# Patient Record
Sex: FEMALE | Race: OTHER | Employment: UNEMPLOYED | URBAN - METROPOLITAN AREA
[De-identification: names, ages, dates, MRNs, and addresses within clinical notes are randomized per-mention and may not be internally consistent; named-entity substitution may affect disease eponyms.]

---

## 2022-04-23 ENCOUNTER — HOSPITAL ENCOUNTER (INPATIENT)
Age: 47
LOS: 3 days | Discharge: HOME | DRG: 645 | End: 2022-04-26
Attending: INTERNAL MEDICINE | Admitting: INTERNAL MEDICINE

## 2022-04-23 ENCOUNTER — APPOINTMENT (OUTPATIENT)
Dept: CT IMAGING | Age: 47
DRG: 645 | End: 2022-04-23

## 2022-04-23 DIAGNOSIS — E27.8 ADRENAL MASS, RIGHT (HCC): Primary | ICD-10-CM

## 2022-04-23 DIAGNOSIS — E11.69 TYPE 2 DIABETES MELLITUS WITH OTHER SPECIFIED COMPLICATION, UNSPECIFIED WHETHER LONG TERM INSULIN USE (HCC): ICD-10-CM

## 2022-04-23 LAB
ALBUMIN SERPL-MCNC: 4.4 GM/DL (ref 3.4–5)
ALP BLD-CCNC: 180 IU/L (ref 40–129)
ALT SERPL-CCNC: 17 U/L (ref 10–40)
ANION GAP SERPL CALCULATED.3IONS-SCNC: 10 MMOL/L (ref 4–16)
AST SERPL-CCNC: 16 IU/L (ref 15–37)
BACTERIA: NEGATIVE /HPF
BANDED NEUTROPHILS ABSOLUTE COUNT: 0.09 K/CU MM
BANDED NEUTROPHILS RELATIVE PERCENT: 1 % (ref 5–11)
BILIRUB SERPL-MCNC: 0.3 MG/DL (ref 0–1)
BILIRUBIN URINE: NEGATIVE MG/DL
BLOOD, URINE: NEGATIVE
BUN BLDV-MCNC: 12 MG/DL (ref 6–23)
CALCIUM SERPL-MCNC: 9.7 MG/DL (ref 8.3–10.6)
CHLORIDE BLD-SCNC: 95 MMOL/L (ref 99–110)
CLARITY: CLEAR
CO2: 28 MMOL/L (ref 21–32)
COLOR: ABNORMAL
CREAT SERPL-MCNC: 0.5 MG/DL (ref 0.6–1.1)
DIFFERENTIAL TYPE: ABNORMAL
EOSINOPHILS ABSOLUTE: 0.2 K/CU MM
EOSINOPHILS RELATIVE PERCENT: 2 % (ref 0–3)
GFR AFRICAN AMERICAN: >60 ML/MIN/1.73M2
GFR NON-AFRICAN AMERICAN: >60 ML/MIN/1.73M2
GLUCOSE BLD-MCNC: 402 MG/DL (ref 70–99)
GLUCOSE BLD-MCNC: 443 MG/DL (ref 70–99)
GLUCOSE, URINE: >1000 MG/DL
HCT VFR BLD CALC: 48.2 % (ref 37–47)
HEMOGLOBIN: 15.1 GM/DL (ref 12.5–16)
KETONES, URINE: NEGATIVE MG/DL
LEUKOCYTE ESTERASE, URINE: NEGATIVE
LIPASE: 41 IU/L (ref 13–60)
LYMPHOCYTES ABSOLUTE: 5 K/CU MM
LYMPHOCYTES RELATIVE PERCENT: 53 % (ref 24–44)
MCH RBC QN AUTO: 28 PG (ref 27–31)
MCHC RBC AUTO-ENTMCNC: 31.3 % (ref 32–36)
MCV RBC AUTO: 89.3 FL (ref 78–100)
MONOCYTES ABSOLUTE: 1 K/CU MM
MONOCYTES RELATIVE PERCENT: 11 % (ref 0–4)
NITRITE URINE, QUANTITATIVE: NEGATIVE
PDW BLD-RTO: 11.9 % (ref 11.7–14.9)
PH, URINE: 6.5 (ref 5–8)
PLATELET # BLD: 312 K/CU MM (ref 140–440)
PLT MORPHOLOGY: ADEQUATE
PMV BLD AUTO: 10.6 FL (ref 7.5–11.1)
POTASSIUM SERPL-SCNC: 3.8 MMOL/L (ref 3.5–5.1)
PROTEIN UA: NEGATIVE MG/DL
RBC # BLD: 5.4 M/CU MM (ref 4.2–5.4)
RBC URINE: 2 /HPF (ref 0–6)
SEGMENTED NEUTROPHILS ABSOLUTE COUNT: 3.1 K/CU MM
SEGMENTED NEUTROPHILS RELATIVE PERCENT: 33 % (ref 36–66)
SMUDGE CELLS: PRESENT
SODIUM BLD-SCNC: 133 MMOL/L (ref 135–145)
SPECIFIC GRAVITY UA: 1.01 (ref 1–1.03)
SQUAMOUS EPITHELIAL: <1 /HPF
T4 FREE: 1.76 NG/DL (ref 0.9–1.8)
TOTAL PROTEIN: 8.2 GM/DL (ref 6.4–8.2)
TRICHOMONAS: ABNORMAL /HPF
TSH HIGH SENSITIVITY: 2.2 UIU/ML (ref 0.27–4.2)
UROBILINOGEN, URINE: 0.2 MG/DL (ref 0.2–1)
WBC # BLD: 9.4 K/CU MM (ref 4–10.5)
WBC # BLD: ABNORMAL 10*3/UL
WBC UA: <1 /HPF (ref 0–5)

## 2022-04-23 PROCEDURE — 84439 ASSAY OF FREE THYROXINE: CPT

## 2022-04-23 PROCEDURE — 81001 URINALYSIS AUTO W/SCOPE: CPT

## 2022-04-23 PROCEDURE — 83835 ASSAY OF METANEPHRINES: CPT

## 2022-04-23 PROCEDURE — 99285 EMERGENCY DEPT VISIT HI MDM: CPT

## 2022-04-23 PROCEDURE — 6360000004 HC RX CONTRAST MEDICATION: Performed by: PHYSICIAN ASSISTANT

## 2022-04-23 PROCEDURE — 74177 CT ABD & PELVIS W/CONTRAST: CPT

## 2022-04-23 PROCEDURE — 82962 GLUCOSE BLOOD TEST: CPT

## 2022-04-23 PROCEDURE — 87086 URINE CULTURE/COLONY COUNT: CPT

## 2022-04-23 PROCEDURE — 84244 ASSAY OF RENIN: CPT

## 2022-04-23 PROCEDURE — 36415 COLL VENOUS BLD VENIPUNCTURE: CPT

## 2022-04-23 PROCEDURE — 80053 COMPREHEN METABOLIC PANEL: CPT

## 2022-04-23 PROCEDURE — 84443 ASSAY THYROID STIM HORMONE: CPT

## 2022-04-23 PROCEDURE — 82627 DEHYDROEPIANDROSTERONE: CPT

## 2022-04-23 PROCEDURE — 83036 HEMOGLOBIN GLYCOSYLATED A1C: CPT

## 2022-04-23 PROCEDURE — 85027 COMPLETE CBC AUTOMATED: CPT

## 2022-04-23 PROCEDURE — 1200000000 HC SEMI PRIVATE

## 2022-04-23 PROCEDURE — 6370000000 HC RX 637 (ALT 250 FOR IP): Performed by: NURSE PRACTITIONER

## 2022-04-23 PROCEDURE — 83690 ASSAY OF LIPASE: CPT

## 2022-04-23 PROCEDURE — 82088 ASSAY OF ALDOSTERONE: CPT

## 2022-04-23 PROCEDURE — 85007 BL SMEAR W/DIFF WBC COUNT: CPT

## 2022-04-23 RX ORDER — LISINOPRIL 20 MG/1
20 TABLET ORAL DAILY
Status: DISCONTINUED | OUTPATIENT
Start: 2022-04-24 | End: 2022-04-26 | Stop reason: HOSPADM

## 2022-04-23 RX ORDER — ATORVASTATIN CALCIUM 10 MG/1
20 TABLET, FILM COATED ORAL NIGHTLY
Status: DISCONTINUED | OUTPATIENT
Start: 2022-04-23 | End: 2022-04-26 | Stop reason: HOSPADM

## 2022-04-23 RX ORDER — SODIUM CHLORIDE 0.9 % (FLUSH) 0.9 %
5-40 SYRINGE (ML) INJECTION PRN
Status: DISCONTINUED | OUTPATIENT
Start: 2022-04-23 | End: 2022-04-26 | Stop reason: HOSPADM

## 2022-04-23 RX ORDER — DEXTROSE MONOHYDRATE 25 G/50ML
12.5 INJECTION, SOLUTION INTRAVENOUS PRN
Status: DISCONTINUED | OUTPATIENT
Start: 2022-04-23 | End: 2022-04-23

## 2022-04-23 RX ORDER — ONDANSETRON 4 MG/1
4 TABLET, ORALLY DISINTEGRATING ORAL EVERY 8 HOURS PRN
Status: DISCONTINUED | OUTPATIENT
Start: 2022-04-23 | End: 2022-04-26 | Stop reason: HOSPADM

## 2022-04-23 RX ORDER — LISINOPRIL 20 MG/1
20 TABLET ORAL DAILY
Status: ON HOLD | COMMUNITY
End: 2022-04-26 | Stop reason: SDUPTHER

## 2022-04-23 RX ORDER — ACETAMINOPHEN 325 MG/1
650 TABLET ORAL EVERY 6 HOURS PRN
Status: DISCONTINUED | OUTPATIENT
Start: 2022-04-23 | End: 2022-04-26 | Stop reason: HOSPADM

## 2022-04-23 RX ORDER — SODIUM CHLORIDE 0.9 % (FLUSH) 0.9 %
5-40 SYRINGE (ML) INJECTION EVERY 12 HOURS SCHEDULED
Status: DISCONTINUED | OUTPATIENT
Start: 2022-04-23 | End: 2022-04-26 | Stop reason: HOSPADM

## 2022-04-23 RX ORDER — DEXTROSE MONOHYDRATE 50 MG/ML
100 INJECTION, SOLUTION INTRAVENOUS PRN
Status: DISCONTINUED | OUTPATIENT
Start: 2022-04-23 | End: 2022-04-26 | Stop reason: HOSPADM

## 2022-04-23 RX ORDER — HYDROCODONE BITARTRATE AND ACETAMINOPHEN 5; 325 MG/1; MG/1
1 TABLET ORAL EVERY 6 HOURS PRN
Status: DISPENSED | OUTPATIENT
Start: 2022-04-23 | End: 2022-04-25

## 2022-04-23 RX ORDER — ACETAMINOPHEN 650 MG/1
650 SUPPOSITORY RECTAL EVERY 6 HOURS PRN
Status: DISCONTINUED | OUTPATIENT
Start: 2022-04-23 | End: 2022-04-26 | Stop reason: HOSPADM

## 2022-04-23 RX ORDER — NICOTINE POLACRILEX 4 MG
15 LOZENGE BUCCAL PRN
Status: DISCONTINUED | OUTPATIENT
Start: 2022-04-23 | End: 2022-04-23

## 2022-04-23 RX ORDER — ONDANSETRON 2 MG/ML
4 INJECTION INTRAMUSCULAR; INTRAVENOUS EVERY 6 HOURS PRN
Status: DISCONTINUED | OUTPATIENT
Start: 2022-04-23 | End: 2022-04-26 | Stop reason: HOSPADM

## 2022-04-23 RX ORDER — SODIUM CHLORIDE 9 MG/ML
25 INJECTION, SOLUTION INTRAVENOUS PRN
Status: DISCONTINUED | OUTPATIENT
Start: 2022-04-23 | End: 2022-04-26 | Stop reason: HOSPADM

## 2022-04-23 RX ORDER — INSULIN LISPRO 100 [IU]/ML
0-12 INJECTION, SOLUTION INTRAVENOUS; SUBCUTANEOUS
Status: DISCONTINUED | OUTPATIENT
Start: 2022-04-24 | End: 2022-04-26

## 2022-04-23 RX ORDER — SIMVASTATIN 20 MG
20 TABLET ORAL NIGHTLY
Status: ON HOLD | COMMUNITY
End: 2022-04-26 | Stop reason: SDUPTHER

## 2022-04-23 RX ORDER — POLYETHYLENE GLYCOL 3350 17 G/17G
17 POWDER, FOR SOLUTION ORAL DAILY PRN
Status: DISCONTINUED | OUTPATIENT
Start: 2022-04-23 | End: 2022-04-26 | Stop reason: HOSPADM

## 2022-04-23 RX ORDER — GLIPIZIDE 10 MG/1
10 TABLET ORAL
Status: ON HOLD | COMMUNITY
End: 2022-04-26 | Stop reason: HOSPADM

## 2022-04-23 RX ORDER — ENOXAPARIN SODIUM 100 MG/ML
40 INJECTION SUBCUTANEOUS DAILY
Status: DISCONTINUED | OUTPATIENT
Start: 2022-04-24 | End: 2022-04-26 | Stop reason: HOSPADM

## 2022-04-23 RX ORDER — INSULIN LISPRO 100 [IU]/ML
0-6 INJECTION, SOLUTION INTRAVENOUS; SUBCUTANEOUS NIGHTLY
Status: DISCONTINUED | OUTPATIENT
Start: 2022-04-23 | End: 2022-04-24

## 2022-04-23 RX ADMIN — ATORVASTATIN CALCIUM 20 MG: 10 TABLET, FILM COATED ORAL at 20:09

## 2022-04-23 RX ADMIN — IOPAMIDOL 75 ML: 755 INJECTION, SOLUTION INTRAVENOUS at 14:44

## 2022-04-23 RX ADMIN — INSULIN LISPRO 6 UNITS: 100 INJECTION, SOLUTION INTRAVENOUS; SUBCUTANEOUS at 20:09

## 2022-04-23 ASSESSMENT — ENCOUNTER SYMPTOMS
NAUSEA: 0
EYE PAIN: 0
BACK PAIN: 0
VOMITING: 0
ABDOMINAL PAIN: 1
RHINORRHEA: 0
SHORTNESS OF BREATH: 0
COUGH: 0
DIARRHEA: 0

## 2022-04-23 ASSESSMENT — PAIN - FUNCTIONAL ASSESSMENT: PAIN_FUNCTIONAL_ASSESSMENT: NONE - DENIES PAIN

## 2022-04-23 NOTE — ED TRIAGE NOTES
Pt is out of medications and could not get a refill, increase in urination at times, general feeling of unwell as well. Pt speaks [place]   number [id number]  PT sts that she has not been feeling well for several days, uncle passed away and she thinks that is one reason why she is not feeling well. Pt states that she has been having abdominal pain and that she has been taking medication for \"blood sugar\" but ran out. Pt sts she has been out of medications for about a month. Pt sts the \"bottom of her belly hurts\" and that she \"goes to the bathroom a lot\".

## 2022-04-23 NOTE — ED PROVIDER NOTES
**ADVANCED PRACTICE PROVIDER, I HAVE EVALUATED THIS PATIENT**        [address] ENCOUNTER      Pt Name: Martínez Arnold  [facility]:[id number]  [place] 1975  Date of evaluation: 4/23/2022  Provider: Ayah Montes PA-C      Chief Complaint:    Chief Complaint   Patient presents with    Urinary Frequency    Other     feels sick         Nursing Notes, Past Medical Hx, Past Surgical Hx, Social Hx, Allergies, and Family Hx were all reviewed and agreed with or any disagreements were addressed in the HPI.    HPI: (Location, Duration, Timing, Severity, Quality, Assoc Sx, Context, Modifying factors)    Chief Complaint of abdominal pain    This is a  52 y.o. female who presents with concern for abdominal pain. During her discussion she mentions: She has had some left lower abdominal pain on and off again for well over a year, mentioning that she has had had at first while she was living in [facility]. Prior to recently coming to [place], she was living in [place] for a year. She describes it as intermittent nonradiating nonmigrating. She mentions that her main reason why she came in here today. Although she does mention that she has had increased urination as well. She tells me that she has been off of her lisinopril atorvastatin glipizide and metformin for approximately 1 month, those were last prescribed by  In [place]. Her last menstrual period was 6 weeks ago. She mentions at that time she had some abdominal pain, but it states that there is different than the abdominal pain that brought her in today. Otherwise she denies any fever, nausea, vomiting, flank pain, dysuria, abnormal vaginal discharge, dyspareunia, vaginal bleeding, chest pain, shortness of breath, URI symptoms. PastMedical/Surgical History:  No past medical history on file. No past surgical history on file.     Medications:  Previous Medications    GLIPIZIDE (GLUCOTROL) 10 MG TABLET    Take 10 mg by mouth 2 times daily (before meals)    LISINOPRIL (PRINIVIL;ZESTRIL) 20 MG TABLET    Take 20 mg by mouth daily    METFORMIN (GLUCOPHAGE) 500 MG TABLET    Take 500 mg by mouth 2 times daily (with meals)    SIMVASTATIN (ZOCOR) 20 MG TABLET    Take 20 mg by mouth nightly         Review of Systems:  (2-9 systems needed)  Review of Systems   Constitutional: Negative for chills and fever. HENT: Negative for congestion and rhinorrhea. Eyes: Negative for pain and visual disturbance. Respiratory: Negative for cough and shortness of breath. Cardiovascular: Negative for chest pain and leg swelling. Gastrointestinal: Positive for abdominal pain. Negative for diarrhea, nausea and vomiting. Endocrine: Positive for polyuria. Genitourinary: Negative for dysuria and hematuria. Musculoskeletal: Negative for back pain and myalgias. Skin: Negative for rash and wound. Neurological: Negative for dizziness and light-headedness. \"Positives and Pertinent negatives as per HPI\"    Physical Exam:  Physical Exam  Vitals and nursing note reviewed. Constitutional:       Appearance: Normal appearance. She is well-developed. She is not ill-appearing or diaphoretic. HENT:      Head: Normocephalic and atraumatic. Eyes:      General: No scleral icterus. Right eye: No discharge. Left eye: No discharge. Cardiovascular:      Rate and Rhythm: Normal rate and regular rhythm. Heart sounds: Normal heart sounds. No murmur heard. No friction rub. No gallop. Pulmonary:      Effort: Pulmonary effort is normal. No respiratory distress. Breath sounds: Normal breath sounds. No stridor. No wheezing or rales. Chest:      Chest wall: No tenderness. Abdominal:      General: Bowel sounds are normal. There is no distension. Palpations: Abdomen is soft. There is no mass. Tenderness: There is abdominal tenderness in the left lower quadrant.  There is no right CVA tenderness, left CVA tenderness, guarding or rebound. Musculoskeletal:         General: No tenderness. Normal range of motion. Cervical back: Normal range of motion and neck supple. Skin:     General: Skin is warm and dry. Coloration: Skin is not pale. Neurological:      Mental Status: She is alert and oriented to person, place, and time. Coordination: Coordination normal.   Psychiatric:         Mood and Affect: Mood normal.         Behavior: Behavior normal.         MEDICAL DECISION MAKING    Vitals:    Vitals:    04/23/22 0947   BP: (!) 179/98   Pulse: 79   Resp: 16   Temp: 98.6 °F (37 °C)   TempSrc: Oral   SpO2: 97%       LABS:  Labs Reviewed   URINALYSIS WITH MICROSCOPIC - Abnormal; Notable for the following components:       Result Value    Color, UA STRAW (*)     Glucose, Urine >1,000 (*)     All other components within normal limits   CBC WITH AUTO DIFFERENTIAL - Abnormal; Notable for the following components:    Hematocrit 48.2 (*)     MCHC 31.3 (*)     Bands Relative 1 (*)     Segs Relative 33.0 (*)     Lymphocytes % 53.0 (*)     Monocytes % 11.0 (*)     All other components within normal limits   COMPREHENSIVE METABOLIC PANEL - Abnormal; Notable for the following components:    Sodium 133 (*)     Chloride 95 (*)     CREATININE 0.5 (*)     Glucose 402 (*)     Alkaline Phosphatase 180 (*)     All other components within normal limits   CULTURE, URINE   LIPASE        Remainder of labs reviewed and were negative at this time or not returned at the time of this note.     RADIOLOGY:   Non-plain film images such as CT, Ultrasound and MRI are read by the radiologist. Nicky Peterson PA-C have directly visualized the radiologic plain film image(s) with the below findings:      Interpretation per the Radiologist below, if available at the time of this note:    CT ABDOMEN PELVIS W IV CONTRAST Additional Contrast? None   Final Result   Heterogeneous mixed attenuating mass in the region of the right adrenal   gland. Differentials include large adrenal adenoma, malignancy or possibly   angiomyolipoma. Adrenal hemorrhage or other primary malignancy not excluded. Recommend attention on follow-up and MRI of the abdomen with and without   contrast adrenal mass protocol. Heterogeneous masslike appearance of the uterus, likely with underlying   uterine fibroids. Other pathology not excluded. Recommend correlation with   pelvic ultrasound. No results found. MEDICAL DECISION MAKING / ED COURSE:      PROCEDURES:   Procedures    None    Patient was given:  Medications   iopamidol (ISOVUE-370) 76 % injection 75 mL (75 mLs IntraVENous Given 4/23/22 2464)       52year-old type II diabetic presents with above HPI. Patient speaks [place], but I was able to have a detailed discussion using video . She is alert oriented no acute distress nontoxic. Appears well-hydrated she has some mild left lower quadrant pain no CVA tenderness. Sounds like she has no local primary care. Her vitals today within normal limits. Plan: Lab work, urinalysis, pregnancy test, consideration for ultrasound and CT due to her left lower quadrant pain. We will also plan to refill her chronic medications, and provide resources for outpatient follow-up. @1600 patient's lab work shows some elevated alk phos and hyperglycemia, no urine ketones. Alk phos 180. Otherwise labs appear to be within normal limits. Vitals within normal limits. CT abdomen pelvis does show a 5.6x0.6 mass, the right adrenal gland region, with a nearby punctate calcification. Interpretation by radiologist includes malignancy, adrenal adenoma, angiomyolipoma. With recommendations for MRI. There are also some masslike appearances in the uterus, appear to be consistent with her history of uterine fibroids.   Discussed this with the ED attending is Dr. Elizabeth Basurto and Dr. Fatemeh Cardona, and we will plan for general surgery consult. Concern we have is that patient has limited outpatient local resources. I also discussed this with case management who had FaceTime with patient, who feels the potential for her to follow-up as an outpatient is extremely low. @5906 I discussed with on-call general surgeon Dr. Nicolas Richmond regarding patient's presenting symptoms, CT findings, and social history. He does feel that potentially patient could be further evaluated as an outpatient, but is agreeable to see patient in consult if admission is warranted due to social situations. He does advise patient will need further biochemical work-up, and agrees for MRI prior to any surgical intervention. After discussing interpreted size of mass \"5.6 x 0.6\" with Dr. Nicolas Richmond who had commented on the dimension of 0.6  I reviewed CT images and it did appear larger, and I contacted [facility] and spoke with their radiologist covering for the initial radiologist Dr. Hunter Verdugo, and he confirmed that there was a error in the documentation. this will be addended, but they are interpreting the size of the mass as 5.6 x 4.6 x 5.9. cm.    @0083 patient discussed with and accepted by hospitalist Worth Goodpasture CNP. The patient tolerated their visit well. I evaluated the patient. The physician was available for consultation as needed. The patient and / or the family were informed of the results of any tests, a time was given to answer questions, a plan was proposed and they agreed with plan. CLINICAL IMPRESSION:  1.  Adrenal mass, right [facility])        DISPOSITION        PATIENT REFERRED TO:  [facility] - ADULT  [address]  [phone]          DISCHARGE MEDICATIONS:  New Prescriptions    No medications on file       DISCONTINUED MEDICATIONS:  Discontinued Medications    No medications on file              (Please note the MDM and HPI sections of this note were completed with a voice recognition

## 2022-04-23 NOTE — CARE COORDINATION
CM - Room # 22 --pt in ER today with medication refill  needs , this is the first visit recorded for this pt . CM will also be using an  for [place] [employer] . Wilfred Cm The four medications that are listed on her chart have been ran thru [employer] for price reduction , and are as follows --;    Simvastin -30 tabs , 20mg----0 .16 cents ----at [facility]     Glipizide---60 tabs, 10 mg----$7.28 ----------at [facility]    Lisinopril--30 tabs , 20 mg ---0.34 cents----at [facility]    Metformin-60 tabs , 500 mg--$5.87----------at [facility]    All coupons given to pt Additionally , [facility] -[facility] , address has been included in discharge information, they have resources for diabetes . Wilfred Cm Last, a follow up for appointment has been given to the [facility] for after care and a PCP.    ----16:35---Due to results that were noted on the Cat Scan abdomen / pelvis--it would be difficult to be a pt in her social and economic position to continue with any continuity of care as  Admission to the hospital enters the picture .      Jessica Tena / Lucila Oscar

## 2022-04-23 NOTE — H&P
History and Physical      Name:  Gabo Arnold /Age/Sex: 1975  (52 y.o. female)   MRN & CSN:  [id number] & [id number] Admission Date/Time: 2022  9:43 AM   Location:  ED22/ED-22 PCP: No primary care provider on file. Hospital Day: 1     Attending physician:  Dr. Mariana Metzger and Plan:   Gabo Arnold is a 52 y.o.  female  who presents with abdominal pain found to have an  Adrenal mass greater than 4 cm in diameter [facility])    Assessment and plan:     Adrenal mass: Right. Symptomatic with abdominal pain. As demonstrated on CT A/P-large heterogenous mass and adjacent puncture clarification in region of the right adrenal gland measuring 5.6 x 4.6. ER attending spoke with radiology who corrected size of mass. -Inpatient  -Consult to general surgery  -Consult to hematology/oncology  -MRI abdomen pelvis with and without contrast  -Analgesics and antiemetics    Diabetes Mellitus type II-with hyperglycemia glucose on admission 402. No previous lab results on file unknown A1c. Patiently recently moved to [place] 9 months ago states has not taken her medication in over a month. Managed on glipizide  - SSI  - POCT glucose qACHS  - hypoglycemia management protocol   - target blood glucose 100-180  - ADA carb controlled diet  -Patient will need referral for follow-up with primary care    Uterine fibroids: As demonstrated on CT abdomen pelvis, patient states she has had those before. -Recommended referral to gynecology at discharge    Pseudohyponatremia: 133-sodium glucose correction-138-monitor BMP    Essential Hypertension- PTA regimen lisinopril. Monitor BP trends. Target /80 or less  -continue home anti-hypertensive medication(s). Hyperlipidemia: Continue OP statin therapy. Last Lipid panel not on file.  -Lipid panel in a.m. Chronic Conditions: Continue all home medications except as stated above or contraindicated.     BMI-no data on file kg/m2 Life style modifications-please obtain height and weight and document results. Disposition: Inpatient Patient was accepted for continue evaluation and treatment and improvement of clinical symptoms. Discussed assessment and treatment plan with the admitting and supervising physician who agrees with the plan. Diet ADULT DIET; Regular; 4 carb choices (60 gm/meal)   DVT Prophylaxis [x] Lovenox, []  Heparin, [] SCDs, [] Warfarin  [] NOAC     GI Prophylaxis [] PPI,  [] H2 Blocker,  [] Carafate,  [] Diet/Tube Feeds   Code Status Full Code     History of Present Illness:     Chief Complaint: Left lower quadrant abdominal pain found to have Adrenal mass greater than 4 cm in diameter ([facility] [address]  Jose David Gagnon is a 52 y.o.  female, with past medical history significant for diabetes mellitus, hypertension, hyperlipidemia, who presented to the emergency room with complaint of abdominal pain. Patient reports she has had left lower quadrant abdominal pain intermittently over the past year. Patient states she was living in [facility] and relocated to [place] then she came to [place] 9 months ago. Patient states he has been approximately 1 month or more since she is taking her regular medications last prescription she had was filled by the doctor in [place]. Last menstrual cycle was 6 weeks ago. Patient denies any nausea, vomiting, flank pain. Denies any abnormal vaginal bleeding. Denies any chest pain or shortness of breath or cough. Patient states she came to the hospital today because of the pain in her left lower abdomen. On Examination,the patient patient is resting comfortably in bed. Remote 's was used, [place], #[id number]. Plan of care was explained to patient she had opportunity to ask questions. She was concerned she may have toe surgery I informed her she will see a general surgeon and the cancer specialist for further evaluation of the mass.   At the ED, vital signs;T 98.6,HR 79, RR 16, /98 mm/hg,SPO2 97% RA Significant laboratories were the following: Sodium 133, glucose 402, alk phos 180  The patient was brought to the ED and was subsequently admitted for  further evaluation. and management        Review of Systems   Constitutional: Negative for chills, diaphoresis and fever. HENT: Negative for congestion. Respiratory: Negative for cough and shortness of breath. Cardiovascular: Positive for chest pain. Negative for palpitations. Gastrointestinal: Positive for abdominal pain. Negative for nausea and vomiting. Genitourinary: Negative for dysuria, frequency and urgency. Musculoskeletal: Negative. Skin: Negative. Neurological: Negative. Hematological: Negative. Psychiatric/Behavioral: Negative. Objective:   No intake or output data in the 24 hours ending 04/23/22 1843   Vitals:   Vitals:    04/23/22 1815   BP: (!) 143/91   Pulse: 90   Resp: 16   Temp: 98.8 °F (37.1 °C)   SpO2: 96%     Physical Exam:   GEN- Awake female, NAD, sitting up in bed, appears to be stated age. EYES- Pupils are equally round. HENT- Mucous membranes are moist.  NECK- Supple, no apparent thyromegaly or masses. RESP-Clear to auscultation, no wheezes, rales or rhonchi. Symmetric chest movement while on room air. CARDIO/VASC-  S1/S2 auscultated. Regular rate and rhythm, . Peripheral pulses equal bilaterally and palpable. GI- Abdomen is soft, no tenderness, masses, or guarding. Bowel sounds present. MSK- No gross joint deformities. EXTREMITIES- pulses intact, no swelling, no calf tenderness  SKIN- Normal coloration, warm, dry. NEURO-Cranial nerves appear grossly intact, normal speech, no lateralizing weakness. PSYCH-Awake, alert, oriented x 4.     Past Medical History:      Past Medical History:   Diagnosis Date    Hyperlipidemia     Hypertension     Type 2 diabetes mellitus without complication, without long-term current use of insulin ([address]     Uterine fibroid      PSHX:  has a past surgical history that includes Ectopic pregnancy surgery (N/A). Allergies: None    FAM HX: family history is not on file. Parents are . Patient denies any known significant family medical history  Soc HX:   Social History     Socioeconomic History    Marital status: Single     Spouse name: Not on file    Number of children: Not on file    Years of education: Not on file    Highest education level: Not on file   Occupational History    Not on file   Tobacco Use    Smoking status: Not on file    Smokeless tobacco: Not on file   Substance and Sexual Activity    Alcohol use: Not on file    Drug use: Not on file    Sexual activity: Not on file   Other Topics Concern    Not on file   Social History Narrative    Not on file     Social and family history reviewed with patient/family. Medications:     Home Medication   Prior to Admission medications    Medication Sig Start Date End Date Taking? Authorizing Provider   metFORMIN (GLUCOPHAGE) 500 MG tablet Take 500 mg by mouth 2 times daily (with meals)   Yes Historical Provider, MD   glipiZIDE (GLUCOTROL) 10 MG tablet Take 10 mg by mouth 2 times daily (before meals)   Yes Historical Provider, MD   simvastatin (ZOCOR) 20 MG tablet Take 20 mg by mouth nightly   Yes Historical Provider, MD   lisinopril (PRINIVIL;ZESTRIL) 20 MG tablet Take 20 mg by mouth daily   Yes Historical Provider, MD     Medications:    Infusions:   PRN Meds:     Recent Labs     22  1100   WBC 9.4   HGB 15.1   HCT 48.2*         Recent Labs     22  1100   *   K 3.8   CL 95*   CO2 28   BUN 12   CREATININE 0.5*     Recent Labs     22  1100   AST 16   ALT 17   BILITOT 0.3   ALKPHOS 180*     No results for input(s): INR in the last 72 hours. No results for input(s): CKTOTAL, CKMB, CKMBINDEX, TROPONINT in the last 72 hours.      Imaging reviewed  CT ABDOMEN PELVIS W IV CONTRAST Additional Contrast? None    Result Date: 2022  EXAMINATION: CT OF THE ABDOMEN AND PELVIS WITH CONTRAST 4/23/2022 2:44 pm TECHNIQUE: CT of the abdomen and pelvis was performed with the administration of intravenous contrast. Multiplanar reformatted images are provided for review. Dose modulation, iterative reconstruction, and/or weight based adjustment of the mA/kV was utilized to reduce the radiation dose to as low as reasonably achievable. COMPARISON: None. HISTORY: ORDERING SYSTEM PROVIDED HISTORY: LLQ pain, intermittent TECHNOLOGIST PROVIDED HISTORY: Additional Contrast?->None Reason for exam:->LLQ pain, intermittent Decision Support Exception - unselect if not a suspected or confirmed emergency medical condition->Emergency Medical Condition (MA) Reason for Exam: LLQ pain, intermittent Relevant Medical/Surgical History: 75 ML ISOVUE 370 FINDINGS: Lower Chest: No focal consolidation at the lung bases. The heart is not enlarged. No pericardial effusion. Organs: Liver: Normal appearance of the liver. Gallbladder: Unremarkable gallbladder. No biliary ductal dilatation Spleen: Unremarkable spleen. Pancreas: No peripancreatic inflammatory changes. Adrenal Glands: There is a large heterogeneous mass and adjacent punctate calcification in the region of the right adrenal gland measuring 5.6 x 0.6 cm. This is age indeterminate without a comparison study. Hyperdensity within the mass could reflect areas of enhancement or hemorrhage. No acute findings of the left adrenal gland. Kidneys: No hydronephrosis punctate stone in each kidney. Small fat containing lesion at the superior pole of the left kidney, most compatible with angiomyolipoma. GI/Bowel: Stomach: The stomach is nondistended. Small bowel: No evidence of small bowel obstruction. Colon: No signficant pericolonic inflammatory changes. Appendix: Appendix not visualized, but there are no secondary findings of acute appendicitis.  Pelvis: Heterogeneous enlarged uterus and endocervical canal.  Fibroids or other underlying mass not excluded. No free fluid in the pelvis. Peritoneum/Retroperitoneum: Atherosclerosis of the abdominal aorta. No retroperitoneal lymphadenopathy by CT criteria. Bones/Soft Tissues: No acute findings within the subcutaneous soft tissues. Spondylosis of the visualized spine. Heterogeneous mixed attenuating mass in the region of the right adrenal gland. Differentials include large adrenal adenoma, malignancy or possibly angiomyolipoma. Adrenal hemorrhage or other primary malignancy not excluded. Recommend attention on follow-up and MRI of the abdomen with and without contrast adrenal mass protocol. Heterogeneous masslike appearance of the uterus, likely with underlying uterine fibroids. Other pathology not excluded. Recommend correlation with pelvic ultrasound.           Electronically signed by THAO Ferreira CNP on 4/23/2022 at 6:43 PM

## 2022-04-23 NOTE — PROGRESS NOTES
Attempted to call report, Charge nurse sts she will call back. Attempt for report x2 Charge RN receiving call from staffing will call this RN back.

## 2022-04-23 NOTE — ED NOTES
CT ABDOMEN PELVIS W IV CONTRAST Additional Contrast? None    Status: Final result       Order Providers    Authorizing Billing   JANAE Dennis DO            Signed by    Signed Date/Time Phone Pager   Keri Carpenter 4/23/2022  3:02 [phone]      Reading Providers    Read Date Phone Pager   Keri Carpenter Sat Apr 23, 2022  3:02 [phone]        CT ABDOMEN PELVIS W IV CONTRAST Additional Contrast? None: Patient Communication     Not Released  Not seen     Radiation Dose Estimates    No radiation information found for this patient  Narrative   EXAMINATION:   CT OF THE ABDOMEN AND PELVIS WITH CONTRAST 4/23/2022 2:44 pm       TECHNIQUE:   CT of the abdomen and pelvis was performed with the administration of   intravenous contrast. Multiplanar reformatted images are provided for review. Dose modulation, iterative reconstruction, and/or weight based adjustment of   the mA/kV was utilized to reduce the radiation dose to as low as reasonably   achievable.       COMPARISON:   None.       HISTORY:   ORDERING SYSTEM PROVIDED HISTORY: LLQ pain, intermittent   TECHNOLOGIST PROVIDED HISTORY:   Additional Contrast?->None   Reason for exam:->LLQ pain, intermittent   Decision Support Exception - unselect if not a suspected or confirmed   emergency medical condition->Emergency Medical Condition (MA)   Reason for Exam: LLQ pain, intermittent   Relevant Medical/Surgical History: 75 ML ISOVUE 370       FINDINGS:   Lower Chest: No focal consolidation at the lung bases.  The heart is not   enlarged.  No pericardial effusion.       Organs:       Liver: Normal appearance of the liver.       Gallbladder: Unremarkable gallbladder. No biliary ductal dilatation       Spleen: Unremarkable spleen.       Pancreas: No peripancreatic inflammatory changes.       Adrenal Glands:  There is a large heterogeneous mass and adjacent punctate   calcification in the region of the right adrenal gland measuring 5.6 x 0.6   cm.  This is age indeterminate without a comparison study.  Hyperdensity   within the mass could reflect areas of enhancement or hemorrhage.  No acute   findings of the left adrenal gland.       Kidneys: No hydronephrosis punctate stone in each kidney.  Small fat   containing lesion at the superior pole of the left kidney, most compatible   with angiomyolipoma.       GI/Bowel:       Stomach: The stomach is nondistended.       Small bowel: No evidence of small bowel obstruction.       Colon: No signficant pericolonic inflammatory changes.       Appendix: Appendix not visualized, but there are no secondary findings of   acute appendicitis.       Pelvis: Heterogeneous enlarged uterus and endocervical canal.  Fibroids or   other underlying mass not excluded.  No free fluid in the pelvis.       Peritoneum/Retroperitoneum: Atherosclerosis of the abdominal aorta.  No   retroperitoneal lymphadenopathy by CT criteria.       Bones/Soft Tissues: No acute findings within the subcutaneous soft tissues. Spondylosis of the visualized spine.           Impression   Heterogeneous mixed attenuating mass in the region of the right adrenal   gland.  Differentials include large adrenal adenoma, malignancy or possibly   angiomyolipoma.  Adrenal hemorrhage or other primary malignancy not excluded. Recommend attention on follow-up and MRI of the abdomen with and without   contrast adrenal mass protocol.       Heterogeneous masslike appearance of the uterus, likely with underlying   uterine fibroids.  Other pathology not excluded.  Recommend correlation with   pelvic ultrasound.           Ambreen Adams RN  04/23/22 1921

## 2022-04-24 ENCOUNTER — APPOINTMENT (OUTPATIENT)
Dept: MRI IMAGING | Age: 47
DRG: 645 | End: 2022-04-24

## 2022-04-24 ENCOUNTER — APPOINTMENT (OUTPATIENT)
Dept: ULTRASOUND IMAGING | Age: 47
DRG: 645 | End: 2022-04-24

## 2022-04-24 LAB
ALBUMIN SERPL-MCNC: 3.9 GM/DL (ref 3.4–5)
ALP BLD-CCNC: 155 IU/L (ref 40–128)
ALT SERPL-CCNC: 14 U/L (ref 10–40)
ANION GAP SERPL CALCULATED.3IONS-SCNC: 8 MMOL/L (ref 4–16)
AST SERPL-CCNC: 12 IU/L (ref 15–37)
BASOPHILS ABSOLUTE: 0 K/CU MM
BASOPHILS RELATIVE PERCENT: 0.4 % (ref 0–1)
BILIRUB SERPL-MCNC: 0.2 MG/DL (ref 0–1)
BUN BLDV-MCNC: 12 MG/DL (ref 6–23)
CALCIUM SERPL-MCNC: 9.2 MG/DL (ref 8.3–10.6)
CHLORIDE BLD-SCNC: 101 MMOL/L (ref 99–110)
CHOLESTEROL: 164 MG/DL
CO2: 26 MMOL/L (ref 21–32)
CREAT SERPL-MCNC: 0.5 MG/DL (ref 0.6–1.1)
CULTURE: NORMAL
DIFFERENTIAL TYPE: ABNORMAL
EOSINOPHILS ABSOLUTE: 0.1 K/CU MM
EOSINOPHILS RELATIVE PERCENT: 1.1 % (ref 0–3)
ESTIMATED AVERAGE GLUCOSE: 326 MG/DL
ESTIMATED AVERAGE GLUCOSE: 329 MG/DL
GFR AFRICAN AMERICAN: >60 ML/MIN/1.73M2
GFR NON-AFRICAN AMERICAN: >60 ML/MIN/1.73M2
GLUCOSE BLD-MCNC: 253 MG/DL (ref 70–99)
GLUCOSE BLD-MCNC: 336 MG/DL (ref 70–99)
GLUCOSE BLD-MCNC: 371 MG/DL (ref 70–99)
GLUCOSE BLD-MCNC: 395 MG/DL (ref 70–99)
GLUCOSE BLD-MCNC: 427 MG/DL (ref 70–99)
GLUCOSE BLD-MCNC: 428 MG/DL (ref 70–99)
HBA1C MFR BLD: 13 % (ref 4.2–6.3)
HBA1C MFR BLD: 13.1 % (ref 4.2–6.3)
HCT VFR BLD CALC: 47.1 % (ref 37–47)
HDLC SERPL-MCNC: 43 MG/DL
HEMOGLOBIN: 14.8 GM/DL (ref 12.5–16)
IMMATURE NEUTROPHIL %: 0.2 % (ref 0–0.43)
LDL CHOLESTEROL CALCULATED: 105 MG/DL
LYMPHOCYTES ABSOLUTE: 3.9 K/CU MM
LYMPHOCYTES RELATIVE PERCENT: 48.8 % (ref 24–44)
Lab: NORMAL
MCH RBC QN AUTO: 27.8 PG (ref 27–31)
MCHC RBC AUTO-ENTMCNC: 31.4 % (ref 32–36)
MCV RBC AUTO: 88.5 FL (ref 78–100)
MONOCYTES ABSOLUTE: 0.5 K/CU MM
MONOCYTES RELATIVE PERCENT: 6.2 % (ref 0–4)
NUCLEATED RBC %: 0 %
PDW BLD-RTO: 12 % (ref 11.7–14.9)
PLATELET # BLD: 310 K/CU MM (ref 140–440)
PMV BLD AUTO: 11.3 FL (ref 7.5–11.1)
POTASSIUM SERPL-SCNC: 4.8 MMOL/L (ref 3.5–5.1)
RBC # BLD: 5.32 M/CU MM (ref 4.2–5.4)
SEGMENTED NEUTROPHILS ABSOLUTE COUNT: 3.5 K/CU MM
SEGMENTED NEUTROPHILS RELATIVE PERCENT: 43.3 % (ref 36–66)
SODIUM BLD-SCNC: 135 MMOL/L (ref 135–145)
SPECIMEN: NORMAL
TOTAL IMMATURE NEUTOROPHIL: 0.02 K/CU MM
TOTAL NUCLEATED RBC: 0 K/CU MM
TOTAL PROTEIN: 6.7 GM/DL (ref 6.4–8.2)
TRIGL SERPL-MCNC: 78 MG/DL
WBC # BLD: 8.1 K/CU MM (ref 4–10.5)

## 2022-04-24 PROCEDURE — 6370000000 HC RX 637 (ALT 250 FOR IP): Performed by: NURSE PRACTITIONER

## 2022-04-24 PROCEDURE — 81050 URINALYSIS VOLUME MEASURE: CPT

## 2022-04-24 PROCEDURE — 94761 N-INVAS EAR/PLS OXIMETRY MLT: CPT

## 2022-04-24 PROCEDURE — 36415 COLL VENOUS BLD VENIPUNCTURE: CPT

## 2022-04-24 PROCEDURE — 82384 ASSAY THREE CATECHOLAMINES: CPT

## 2022-04-24 PROCEDURE — A9579 GAD-BASE MR CONTRAST NOS,1ML: HCPCS | Performed by: NURSE PRACTITIONER

## 2022-04-24 PROCEDURE — 85025 COMPLETE CBC W/AUTO DIFF WBC: CPT

## 2022-04-24 PROCEDURE — 82962 GLUCOSE BLOOD TEST: CPT

## 2022-04-24 PROCEDURE — 2580000003 HC RX 258: Performed by: NURSE PRACTITIONER

## 2022-04-24 PROCEDURE — 1200000000 HC SEMI PRIVATE

## 2022-04-24 PROCEDURE — 6360000004 HC RX CONTRAST MEDICATION: Performed by: NURSE PRACTITIONER

## 2022-04-24 PROCEDURE — 93975 VASCULAR STUDY: CPT

## 2022-04-24 PROCEDURE — 99254 IP/OBS CNSLTJ NEW/EST MOD 60: CPT | Performed by: SURGERY

## 2022-04-24 PROCEDURE — 74183 MRI ABD W/O CNTR FLWD CNTR: CPT

## 2022-04-24 PROCEDURE — 82530 CORTISOL FREE: CPT

## 2022-04-24 PROCEDURE — 99255 IP/OBS CONSLTJ NEW/EST HI 80: CPT | Performed by: INTERNAL MEDICINE

## 2022-04-24 PROCEDURE — 83835 ASSAY OF METANEPHRINES: CPT

## 2022-04-24 PROCEDURE — 80053 COMPREHEN METABOLIC PANEL: CPT

## 2022-04-24 PROCEDURE — 6370000000 HC RX 637 (ALT 250 FOR IP): Performed by: INTERNAL MEDICINE

## 2022-04-24 PROCEDURE — 84585 ASSAY OF URINE VMA: CPT

## 2022-04-24 PROCEDURE — 80061 LIPID PANEL: CPT

## 2022-04-24 PROCEDURE — 83036 HEMOGLOBIN GLYCOSYLATED A1C: CPT

## 2022-04-24 PROCEDURE — 76856 US EXAM PELVIC COMPLETE: CPT

## 2022-04-24 PROCEDURE — 6360000002 HC RX W HCPCS: Performed by: NURSE PRACTITIONER

## 2022-04-24 RX ORDER — INSULIN LISPRO 100 [IU]/ML
15 INJECTION, SOLUTION INTRAVENOUS; SUBCUTANEOUS
Status: DISCONTINUED | OUTPATIENT
Start: 2022-04-24 | End: 2022-04-26 | Stop reason: HOSPADM

## 2022-04-24 RX ORDER — INSULIN GLARGINE 100 [IU]/ML
40 INJECTION, SOLUTION SUBCUTANEOUS NIGHTLY
Status: DISCONTINUED | OUTPATIENT
Start: 2022-04-24 | End: 2022-04-25

## 2022-04-24 RX ORDER — INSULIN LISPRO 100 [IU]/ML
0-6 INJECTION, SOLUTION INTRAVENOUS; SUBCUTANEOUS
Status: DISCONTINUED | OUTPATIENT
Start: 2022-04-24 | End: 2022-04-25

## 2022-04-24 RX ADMIN — SODIUM CHLORIDE, PRESERVATIVE FREE 10 ML: 5 INJECTION INTRAVENOUS at 08:52

## 2022-04-24 RX ADMIN — INSULIN LISPRO 3 UNITS: 100 INJECTION, SOLUTION INTRAVENOUS; SUBCUTANEOUS at 21:00

## 2022-04-24 RX ADMIN — INSULIN LISPRO 12 UNITS: 100 INJECTION, SOLUTION INTRAVENOUS; SUBCUTANEOUS at 18:26

## 2022-04-24 RX ADMIN — INSULIN LISPRO 8 UNITS: 100 INJECTION, SOLUTION INTRAVENOUS; SUBCUTANEOUS at 08:53

## 2022-04-24 RX ADMIN — GADOTERIDOL 15 ML: 279.3 INJECTION, SOLUTION INTRAVENOUS at 18:29

## 2022-04-24 RX ADMIN — INSULIN LISPRO 15 UNITS: 100 INJECTION, SOLUTION INTRAVENOUS; SUBCUTANEOUS at 18:27

## 2022-04-24 RX ADMIN — LISINOPRIL 20 MG: 20 TABLET ORAL at 08:52

## 2022-04-24 RX ADMIN — SODIUM CHLORIDE, PRESERVATIVE FREE 10 ML: 5 INJECTION INTRAVENOUS at 21:57

## 2022-04-24 RX ADMIN — INSULIN GLARGINE 40 UNITS: 100 INJECTION, SOLUTION SUBCUTANEOUS at 21:01

## 2022-04-24 RX ADMIN — INSULIN HUMAN 15 UNITS: 100 INJECTION, SUSPENSION SUBCUTANEOUS at 12:13

## 2022-04-24 RX ADMIN — HYDROCODONE BITARTRATE AND ACETAMINOPHEN 1 TABLET: 5; 325 TABLET ORAL at 20:58

## 2022-04-24 RX ADMIN — INSULIN LISPRO 10 UNITS: 100 INJECTION, SOLUTION INTRAVENOUS; SUBCUTANEOUS at 13:03

## 2022-04-24 RX ADMIN — ATORVASTATIN CALCIUM 20 MG: 10 TABLET, FILM COATED ORAL at 20:58

## 2022-04-24 RX ADMIN — INSULIN LISPRO 15 UNITS: 100 INJECTION, SOLUTION INTRAVENOUS; SUBCUTANEOUS at 13:05

## 2022-04-24 RX ADMIN — ENOXAPARIN SODIUM 40 MG: 100 INJECTION SUBCUTANEOUS at 08:52

## 2022-04-24 ASSESSMENT — PAIN SCALES - WONG BAKER: WONGBAKER_NUMERICALRESPONSE: 0

## 2022-04-24 ASSESSMENT — PAIN DESCRIPTION - ORIENTATION: ORIENTATION: RIGHT;LEFT

## 2022-04-24 ASSESSMENT — PAIN DESCRIPTION - DESCRIPTORS: DESCRIPTORS: ACHING

## 2022-04-24 ASSESSMENT — PAIN SCALES - GENERAL
PAINLEVEL_OUTOF10: 2
PAINLEVEL_OUTOF10: 6

## 2022-04-24 ASSESSMENT — PAIN - FUNCTIONAL ASSESSMENT: PAIN_FUNCTIONAL_ASSESSMENT: ACTIVITIES ARE NOT PREVENTED

## 2022-04-24 NOTE — CONSULTS
Notified of patient who presented with LLQ pain, found to have incidental right adrenal mass on CT.     - will start biochemical workup to rule out functioning mass(24h urine catecholamines/metanephrines, serum aldosterone/renin/metanephrines and DHEAS) may need overnight dexamethasone suppression test as well  - needs pelvic US as well to further evaluate abnormal uterus on CT  - will likely need adrenalectomy given the size of the mass  - Will further evaluate and perform full consult in the AM    Electronically signed by Eloy Scott MD on 4/23/2022 at 9:03 PM

## 2022-04-24 NOTE — CONSULTS
Endocrinology   Consult Note  4/23/2022  9:43 AM     Primary Care provider: No primary care provider on file. Referring physician:  Roberto London MD     Dear Jonah/Que     Thank You for the Consult     Pt. Was Admitted for : Left-sided abdominal pain/work-up showed history of right-sided adrenal mass    Reason for Consult: Better evaluate her adrenal mass on right side and also better control of blood glucose      History Obtained From:  Patient/ EMR       HISTORY OF PRESENT ILLNESS:                The patient is a 52 y.o. female with significant past medical history of hypertension, hyperlipidemia, diabetes mellitus history of uterine fibroid comes in complaining of abdominal pain. CT scan done showed that she had a right-sided adrenal mass BX size. I was  consulted for better control of blood glucose. Also evaluate the biochemical nature of the adrenal mass. Surgery and oncology already been consulted      ROS:   Pt's ROS done in detail. Abnormal ROS are noted in Medical and Surgical History Section below: Other Medical History:        Diagnosis Date    Hyperlipidemia     Hypertension     Tubal ectopic pregnancy     Type 2 diabetes mellitus without complication, without long-term current use of insulin (HCC)     Uterine fibroid      Surgical History:        Procedure Laterality Date    ECTOPIC PREGNANCY SURGERY N/A        Allergies:  Patient has no known allergies. Family History:   History reviewed. No pertinent family history. REVIEW OF SYSTEMS:  Review of System Done as noted above     PHYSICAL EXAM:      Vitals:    BP (!) 160/89   Pulse 74   Temp 97.7 °F (36.5 °C) (Oral)   Resp 16   Ht 5' 7\" (1.702 m)   Wt 150 lb (68 kg)   SpO2 96%   BMI 23.49 kg/m²     CONSTITUTIONAL:  awake, alert, cooperative, appears stated age   EYES:  vision intact Fundoscopic Exam not performed   ENT:Normal  NECK:  Supple, No JVD.    Thyroid Exam:Normal   LUNGS:  Has Vesicular Breath Sounds, CARDIOVASCULAR:  Normal apical impulse, regular rate and rhythm, normal S1 and S2, no S3 or S4, and has no  murmur   ABDOMEN:  No scars, normal bowel sounds, soft, non-distended, left side tender, no masses palpated, no hepatolienomegaly  Musculoskeletal: Normal  Extremities: Normal, peripheral pulses normal, , has no edema   NEUROLOGIC:  Awake, alert, oriented to name, place and time. Cranial nerves II-XII are grossly intact. Motor is  intact. Sensory is intact. ,  and gait is normal.    DATA:    CBC:   Recent Labs     04/23/22  1100 04/24/22  0124   WBC 9.4 8.1   HGB 15.1 14.8    310    CMP:  Recent Labs     04/23/22  1100 04/24/22  0124   * 135   K 3.8 4.8   CL 95* 101   CO2 28 26   BUN 12 12   CREATININE 0.5* 0.5*   CALCIUM 9.7 9.2   PROT 8.2 6.7   LABALBU 4.4 3.9   BILITOT 0.3 0.2   ALKPHOS 180* 155*   AST 16 12*   ALT 17 14     Lipids:   Lab Results   Component Value Date    CHOL 164 04/24/2022    HDL 43 04/24/2022    TRIG 78 04/24/2022     Glucose:   Recent Labs     04/23/22  1941 04/24/22  0845   POCGLU 443* 336*     Hemoglobin A1C:   Lab Results   Component Value Date    LABA1C 13.1 04/24/2022     Free T4:   Lab Results   Component Value Date    T4FREE 1.76 04/23/2022     Free T3: No results found for: FT3  TSH High Sensitivity:   Lab Results   Component Value Date    TSHHS 2.200 04/23/2022       CT ABDOMEN PELVIS W IV CONTRAST Additional Contrast? None   Final Result   Heterogeneous mixed attenuating mass in the region of the right adrenal   gland. Differentials include large adrenal adenoma, malignancy or possibly   angiomyolipoma. Adrenal hemorrhage or other primary malignancy not excluded. Recommend attention on follow-up and MRI of the abdomen with and without   contrast adrenal mass protocol. Heterogeneous masslike appearance of the uterus, likely with underlying   uterine fibroids. Other pathology not excluded. Recommend correlation with   pelvic ultrasound.          MRI ABDOMEN W WO CONTRAST    (Results Pending)   US PELVIS COMPLETE    (Results Pending)          Scheduled Medicines   Medications:    insulin lispro  0-6 Units SubCUTAneous 2 times per day    insulin NPH  15 Units SubCUTAneous Once    insulin glargine  40 Units SubCUTAneous Nightly    insulin lispro  15 Units SubCUTAneous TID WC    lisinopril  20 mg Oral Daily    atorvastatin  20 mg Oral Nightly    sodium chloride flush  5-40 mL IntraVENous 2 times per day    enoxaparin  40 mg SubCUTAneous Daily    insulin lispro  0-12 Units SubCUTAneous TID WC      Infusions:    dextrose      sodium chloride           IMPRESSION    Patient Active Problem List   Diagnosis    Adrenal mass greater than 4 cm in diameter (HCC)      Diabetes mellitus      Hypertension      RECOMMENDATIONS:      1. Reviewed POC blood glucose . Labs and X ray results   2. Reviewed Home and Current Medicines   3. Will Start On meal/ Correction bolus Humalog/ Lantus Insulin regime    4. Monitor Blood glucose frequently   5. 24 hr urine for cortisol, catecholamines, metanephrine ans VMA   6. After the urine is collected , will do overnight diamthazole suppression test.    7. Modify  the dose of Insulin  as needed        Will follow with you  Again thank you for sharing pt's care with me.      Truly yours,       Faby Espinal MD

## 2022-04-24 NOTE — PROGRESS NOTES
Hospitalist Progress Note      Name:  Courtney Guerrero /Age/Sex: 1975  (52 y.o. female)   MRN & CSN:  [id number] & [id number] Admission Date/Time: 2022  9:43 AM   [facility]  [facility] PCP: No primary care provider on file. Hospital Day: 2    Assessment and Plan:   Courtney Guerrero is a 52 y.o.  female  who presents with Adrenal mass greater than 4 cm in diameter ([facility] [address]    Courtney Guerrero is a 52 y.o.  female  who presents with abdominal pain found to have an  Adrenal mass greater than 4 cm in diameter [facility])     Assessment and plan:      Adrenal mass: Right. Symptomatic with abdominal pain. As demonstrated on CT A/P-large heterogenous mass and adjacent puncture clarification in region of the right adrenal gland measuring 5.6 x 4.6. ER attending spoke with radiology who corrected size of mass. -Inpatient  -Consult to general surgery  -Consult to hematology/oncology and endocrinology  -MRI abdomen pelvis with and without contrast  -Analgesics and antiemetics     Diabetes Mellitus type II-with hyperglycemia glucose on admission 402. No previous lab results on file unknown A1c. Patiently recently moved [place] 9 months ago states has not taken her medication in over a month. Managed on glipizide  - SSI  - POCT glucose qACHS  - hypoglycemia management protocol   - target blood glucose 100-180  - ADA carb controlled diet  -Patient will need referral for follow-up with primary care     Uterine fibroids: As demonstrated on CT abdomen pelvis, patient states she has had those before. -Recommended referral to gynecology at discharge     Pseudohyponatremia: 133-sodium glucose correction-138-monitor BMP     Essential Hypertension- PTA regimen lisinopril. Monitor BP trends. Target /80 or less  -continue home anti-hypertensive medication(s).     Hyperlipidemia: Continue OP statin therapy.   Last Lipid panel not on file.  -Lipid panel in a.m.     Chronic Conditions: Continue all home medications except as stated above or contraindicated.     BMI-no data on file kg/m2 Life style modifications-please obtain height and weight and document results. Diet ADULT DIET; Regular; 4 carb choices (60 gm/meal)   DVT Prophylaxis [] Lovenox, []  Heparin, [] SCDs, [] Ambulation   GI Prophylaxis [] PPI,  [] H2 Blocker,  [] Carafate,  [] Diet/Tube Feeds   Code Status Full Code   Disposition Patient requires continued admission due to adrenal mass   MDM [] Low, [] Moderate,[]  High  Patient's risk as above due to adrenal mass     History of Present Illness:     Chief Complaint: Adrenal mass greater than 4 cm in diameter ([address]  Ashleigh Potter is a 52 y.o.  female  who presents with lower left quadrant pain. Subjective-patient examined at bedside. She is comfortable she denies any distress. Labs reviewed. Consultant notes reviewed. Consulting endocrinology as well. Continue current measures. Work-up in progress. Defer to endocrinology and surgery. Ten point ROS reviewed negative, unless as noted above    Objective:   No intake or output data in the 24 hours ending 04/24/22 0858   Vitals:   Vitals:    04/24/22 0845   BP: (!) 160/89   Pulse: 74   Resp: 16   Temp: 97.7 °F (36.5 °C)   SpO2: 96%          No results for input(s): PH, PVQ1FEU, PO2ART, BE, ATQ9DSV, CO2CT, O2SAT, LABCARB in the last 72 hours. Invalid input(s): METHGBART           Physical Exam:   GEN Awake female, sitting upright in bed in no apparent distress. Appears given age. EYES Pupils are equally round. No scleral erythema, discharge, or conjunctivitis. HENT Mucous membranes are moist. Oral pharynx without exudates, no evidence of thrush. NECK Supple, no apparent thyromegaly or masses. RESP Clear to auscultation, no wheezes, rales or rhonchi. Symmetric chest movement while on room air. CARDIO/VASC S1/S2 auscultated. Regular rate without appreciable murmurs, rubs, or gallops. No JVD or carotid bruits.  Peripheral pulses equal bilaterally and palpable. No peripheral edema. GI Abdomen is soft without significant tenderness, masses, or guarding. Bowel sounds are normoactive. Rectal exam deferred.  No costovertebral angle tenderness. Normal appearing external genitalia. Lazaro catheter is not present. HEME/LYMPH No palpable cervical lymphadenopathy and no hepatosplenomegaly. No petechiae or ecchymoses. MSK No gross joint deformities. SKIN Normal coloration, warm, dry. NEURO Cranial nerves appear grossly intact, normal speech, no lateralizing weakness. PSYCH Awake, alert, oriented x 4. Affect appropriate.     Data:   CBC with Differential:    Lab Results   Component Value Date    WBC 8.1 04/24/2022    RBC 5.32 04/24/2022    HGB 14.8 04/24/2022    HCT 47.1 04/24/2022     04/24/2022    MCV 88.5 04/24/2022    MCH 27.8 04/24/2022    MCHC 31.4 04/24/2022    RDW 12.0 04/24/2022    SEGSPCT 43.3 04/24/2022    BANDSPCT 1 04/23/2022    LYMPHOPCT 48.8 04/24/2022    MONOPCT 6.2 04/24/2022    BASOPCT 0.4 04/24/2022    MONOSABS 0.5 04/24/2022    LYMPHSABS 3.9 04/24/2022    EOSABS 0.1 04/24/2022    BASOSABS 0.0 04/24/2022    DIFFTYPE AUTOMATED DIFFERENTIAL 04/24/2022       CMP:     Lab Results   Component Value Date     04/24/2022    K 4.8 04/24/2022     04/24/2022    CO2 26 04/24/2022    BUN 12 04/24/2022    CREATININE 0.5 04/24/2022    GFRAA >60 04/24/2022    LABGLOM >60 04/24/2022    GLUCOSE 395 04/24/2022    PROT 6.7 04/24/2022    LABALBU 3.9 04/24/2022    CALCIUM 9.2 04/24/2022    BILITOT 0.2 04/24/2022    ALKPHOS 155 04/24/2022    AST 12 04/24/2022    ALT 14 04/24/2022       Troponin:  No results found for: TROPONINT    U/A:    Lab Results   Component Value Date    COLORU STRAW 04/23/2022    PROTEINU NEGATIVE 04/23/2022    WBCUA <1 04/23/2022    RBCUA 2 04/23/2022    TRICHOMONAS NONE SEEN 04/23/2022    BACTERIA NEGATIVE 04/23/2022    CLARITYU CLEAR 04/23/2022    SPECGRAV 1.010 04/23/2022    LEUKOCYTESUR NEGATIVE 04/23/2022    UROBILINOGEN 0.2 04/23/2022    BILIRUBINUR NEGATIVE 04/23/2022    BLOODU NEGATIVE 04/23/2022       Urine Culture:  No components found for: JOAQUIN    Radiology results:  CT ABDOMEN PELVIS W IV CONTRAST Additional Contrast? None   Final Result   Heterogeneous mixed attenuating mass in the region of the right adrenal   gland. Differentials include large adrenal adenoma, malignancy or possibly   angiomyolipoma. Adrenal hemorrhage or other primary malignancy not excluded. Recommend attention on follow-up and MRI of the abdomen with and without   contrast adrenal mass protocol. Heterogeneous masslike appearance of the uterus, likely with underlying   uterine fibroids. Other pathology not excluded. Recommend correlation with   pelvic ultrasound.          MRI ABDOMEN W WO CONTRAST    (Results Pending)   US PELVIS COMPLETE    (Results Pending)       Medications:   Medications:    lisinopril  20 mg Oral Daily    atorvastatin  20 mg Oral Nightly    sodium chloride flush  5-40 mL IntraVENous 2 times per day    enoxaparin  40 mg SubCUTAneous Daily    insulin lispro  0-12 Units SubCUTAneous TID WC    insulin lispro  0-6 Units SubCUTAneous Nightly      Infusions:    dextrose      sodium chloride       PRN Meds: glucagon (rDNA), 1 mg, PRN  dextrose, 100 mL/hr, PRN  sodium chloride flush, 5-40 mL, PRN  sodium chloride, 25 mL, PRN  ondansetron, 4 mg, Q8H PRN   Or  ondansetron, 4 mg, Q6H PRN  polyethylene glycol, 17 g, Daily PRN  acetaminophen, 650 mg, Q6H PRN   Or  acetaminophen, 650 mg, Q6H PRN  HYDROcodone-acetaminophen, 1 tablet, Q6H PRN  dextrose bolus (hypoglycemia), 125 mL, PRN   Or  dextrose bolus (hypoglycemia), 250 mL, PRN  glucose, 4 tablet, PRN          Electronically signed by Pedro Tong MD on 4/24/2022 at 8:58 AM

## 2022-04-24 NOTE — CONSULTS
Hematology/Oncology  Consult Note      Reason for Consult:  Abnormal CT abdomen  Requesting Physician:  ER    CHIEF COMPLAINT:  Lower abdominal pain. History Obtained From:  patient, electronic medical record  We use . HISTORY OF PRESENT ILLNESS:      The patient is a 52 y.o. female with significant past medical history of DM who presents with above. She stays with friend here. She was in [place] and told to have uterine fibroid. Since she did not have money she did not pursue surgery. She has one child. 10 years ago she had abdominal surgery for ectopic pregnancy in [place]. She is from [facility]. She has felt that she were pregnant and felt movement. She has irregular menstruation. CT abdomen 4/23/2022:  Heterogeneous mixed attenuating mass in the region of the right adrenal  gland.  Differentials include large adrenal adenoma, malignancy or possibly angiomyolipoma.  Adrenal hemorrhage or other primary malignancy not excluded. Recommend attention on follow-up and MRI of the abdomen with and without contrast adrenal mass protocol.   Heterogeneous masslike appearance of the uterus, likely with underlying  uterine fibroids.  Other pathology not excluded.  Recommend correlation with pelvic ultrasound. Dr Brittaney Wilson and I saw her today and explained to her about her abnormal CT findings. Agreed with w/u to rule out functioning adrenal adenoma, MRI of the adrenal gland and pelvic US. She may need gyn evaluation also.     Past Medical History:        Diagnosis Date    Hyperlipidemia     Hypertension     Tubal ectopic pregnancy     Type 2 diabetes mellitus without complication, without long-term current use of insulin (HCC)     Uterine fibroid      Past Surgical History:        Procedure Laterality Date    ECTOPIC PREGNANCY SURGERY N/A        Current Medications:    Current Facility-Administered Medications: lisinopril (PRINIVIL;ZESTRIL) tablet 20 mg, 20 mg, Oral, Daily  atorvastatin (LIPITOR) tablet 20 mg, 20 mg, Oral, Nightly  glucagon (rDNA) injection 1 mg, 1 mg, IntraMUSCular, PRN  dextrose 5 % solution, 100 mL/hr, IntraVENous, PRN  sodium chloride flush 0.9 % injection 5-40 mL, 5-40 mL, IntraVENous, 2 times per day  sodium chloride flush 0.9 % injection 5-40 mL, 5-40 mL, IntraVENous, PRN  0.9 % sodium chloride infusion, 25 mL, IntraVENous, PRN  enoxaparin (LOVENOX) injection 40 mg, 40 mg, SubCUTAneous, Daily  ondansetron (ZOFRAN-ODT) disintegrating tablet 4 mg, 4 mg, Oral, Q8H PRN **OR** ondansetron (ZOFRAN) injection 4 mg, 4 mg, IntraVENous, Q6H PRN  polyethylene glycol (GLYCOLAX) packet 17 g, 17 g, Oral, Daily PRN  acetaminophen (TYLENOL) tablet 650 mg, 650 mg, Oral, Q6H PRN **OR** acetaminophen (TYLENOL) suppository 650 mg, 650 mg, Rectal, Q6H PRN  insulin lispro (HUMALOG) injection vial 0-12 Units, 0-12 Units, SubCUTAneous, TID WC  insulin lispro (HUMALOG) injection vial 0-6 Units, 0-6 Units, SubCUTAneous, Nightly  HYDROcodone-acetaminophen (NORCO) 5-325 MG per tablet 1 tablet, 1 tablet, Oral, Q6H PRN  dextrose bolus (hypoglycemia) 10% 125 mL, 125 mL, IntraVENous, PRN **OR** dextrose bolus (hypoglycemia) 10% 250 mL, 250 mL, IntraVENous, PRN  glucose chewable tablet 16 g, 4 tablet, Oral, PRN    Allergies:  Patient has no known allergies. Social History:    TOBACCO:   reports that she has never smoked. She does not have any smokeless tobacco history on file. ETOH:   reports no history of alcohol use. DRUGS:   has no history on file for drug use. Family History:   History reviewed. No pertinent family history. REVIEW OF SYSTEMS:    Abdominal pain is better. No NVD. No fever or chills. No SOB or chest pain. The remainder of ROS is unremarkable.     PHYSICAL EXAM:      Vitals:    BP (!) 160/89   Pulse 74   Temp 97.7 °F (36.5 °C) (Oral)   Resp 16   Ht 5' 7\" (1.702 m)   Wt 150 lb (68 kg)   SpO2 96%   BMI 23.49 kg/m²     CONSTITUTIONAL:  awake, alert, cooperative and no apparent distress  EYES:  extra-ocular muscles intact and conjunctiva normal  NECK:  supple, symmetrical, trachea midline and no lymphadenopathy  LUNGS:  clear to auscultation, no crackles or wheezing  CARDIOVASCULAR:  normal S1 and S2 and no murmur noted  ABDOMEN:  normal bowel sounds, soft, non-distended and tenderness to lower abdomen  MUSCULOSKELETAL:  there is no redness, warmth, or swelling of the joints. Full range of motion noted  NEUROLOGIC:  cranial nerves II-XII are grossly intact  SKIN:  no bruising or bleeding and no jaundice    DATA:    Labs:  General Labs:    CBC with Differential:    Lab Results   Component Value Date    WBC 8.1 04/24/2022    RBC 5.32 04/24/2022    HGB 14.8 04/24/2022    HCT 47.1 04/24/2022     04/24/2022    MCV 88.5 04/24/2022    MCH 27.8 04/24/2022    MCHC 31.4 04/24/2022    RDW 12.0 04/24/2022    SEGSPCT 43.3 04/24/2022    BANDSPCT 1 04/23/2022    LYMPHOPCT 48.8 04/24/2022    MONOPCT 6.2 04/24/2022    BASOPCT 0.4 04/24/2022    MONOSABS 0.5 04/24/2022    LYMPHSABS 3.9 04/24/2022    EOSABS 0.1 04/24/2022    BASOSABS 0.0 04/24/2022    DIFFTYPE AUTOMATED DIFFERENTIAL 04/24/2022     CMP:    Lab Results   Component Value Date     04/24/2022    K 4.8 04/24/2022     04/24/2022    CO2 26 04/24/2022    BUN 12 04/24/2022    CREATININE 0.5 04/24/2022    GFRAA >60 04/24/2022    LABGLOM >60 04/24/2022    GLUCOSE 395 04/24/2022    PROT 6.7 04/24/2022    LABALBU 3.9 04/24/2022    CALCIUM 9.2 04/24/2022    BILITOT 0.2 04/24/2022    ALKPHOS 155 04/24/2022    AST 12 04/24/2022    ALT 14 04/24/2022     IMPRESSION/RECOMMENDATIONS:      1. She has abnormal CT. D/w pt about the w/u. Agreed with Dr Deena Garnica. 2. DM. Ayaka Guillen have Dr Erin Ramos to help her DM. We discuss about low carb diet. Thanks for allowing us to participate in her care. Will follow up.

## 2022-04-24 NOTE — CONSULTS
Department of General Surgery   Surgical Service Dr. Ron Addison   Consult Note    Date of Consult: 4/24/22    Reason for Consult:  Adrenal mass  Requesting Physician:  Hanh Craven PA-C    CHIEF COMPLAINT:  Abdominal pain    History Obtained From:  patient    HISTORY OF PRESENT ILLNESS:    The patient is a 52 y.o. female who presented with LLQ abdominal pain. Pain had been present intermittently for a month. She reports a history of fibroids diagnosed 5 years ago in [facility] but she did not have the means to have any intervention. She also has a history of tubal pregnancy requiring surgery 10 years ago. Currently her pain has resolved. She denies current symptoms. Workup in the ED yesterday demonstrated 5.6 cm right adrenal mass as well as uterine mass that could be fibroids vs other etiology. She reports feeling of pelvic fullness and \"fluttering\" at times in her pelvis. She has irregular periods that are sometimes heavy, sometimes just spotting. She was found to be significantly hyperglycemic with glucose in the 400s and A1C of 13.1.       Past Medical History:    Past Medical History:   Diagnosis Date    Hyperlipidemia     Hypertension     Tubal ectopic pregnancy     Type 2 diabetes mellitus without complication, without long-term current use of insulin (HCC)     Uterine fibroid        Past Surgical History:    Past Surgical History:   Procedure Laterality Date    ECTOPIC PREGNANCY SURGERY N/A        Current Medications:   Current Facility-Administered Medications   Medication Dose Route Frequency Provider Last Rate Last Admin    lisinopril (PRINIVIL;ZESTRIL) tablet 20 mg  20 mg Oral Daily Abhay Sullivan APRN - CNP   20 mg at 04/24/22 0691    atorvastatin (LIPITOR) tablet 20 mg  20 mg Oral Nightly Abhayregina Sullivan, APRN - CNP   20 mg at 04/23/22 2009    glucagon (rDNA) injection 1 mg  1 mg IntraMUSCular PRN Abhay Nate, APRN - CNP        dextrose 5 % solution  100 mL/hr IntraVENous PRN  Grills, APRN - CNP        sodium chloride flush 0.9 % injection 5-40 mL  5-40 mL IntraVENous 2 times per day  Grills, APRN - CNP   10 mL at 04/24/22 1465    sodium chloride flush 0.9 % injection 5-40 mL  5-40 mL IntraVENous PRN  Grills, APRN - CNP        0.9 % sodium chloride infusion  25 mL IntraVENous PRN  Grills, APRN - CNP        enoxaparin (LOVENOX) injection 40 mg  40 mg SubCUTAneous Daily  Grills, APRN - CNP   40 mg at 04/24/22 0852    ondansetron (ZOFRAN-ODT) disintegrating tablet 4 mg  4 mg Oral Q8H PRN  Grills, APRN - CNP        Or    ondansetron [facility]) injection 4 mg  4 mg IntraVENous Q6H PRN  Grills, APRN - CNP        polyethylene glycol (GLYCOLAX) packet 17 g  17 g Oral Daily PRN  Grills, APRN - CNP        acetaminophen (TYLENOL) tablet 650 mg  650 mg Oral Q6H PRN  Grills, APRN - CNP        Or    acetaminophen (TYLENOL) suppository 650 mg  650 mg Rectal Q6H PRN  Grills, APRN - CNP        insulin lispro (HUMALOG) injection vial 0-12 Units  0-12 Units SubCUTAneous TID WC  Grills, APRN - CNP        insulin lispro (HUMALOG) injection vial 0-6 Units  0-6 Units SubCUTAneous Nightly  Grills, APRN - CNP   6 Units at 04/23/22 2009    HYDROcodone-acetaminophen (NORCO) 5-325 MG per tablet 1 tablet  1 tablet Oral Q6H PRN  Grills, APRN - CNP        dextrose bolus (hypoglycemia) 10% 125 mL  125 mL IntraVENous PRN  Grills, APRN - CNP        Or    dextrose bolus (hypoglycemia) 10% 250 mL  250 mL IntraVENous PRN  Grills, APRN - CNP        glucose chewable tablet 16 g  4 tablet Oral PRN  Grills, APRN - CNP           Allergies:  Patient has no known allergies.     Social History:   Social History     Socioeconomic History    Marital status: Single     Spouse name: None    Number of children: None    Years of education: None    Highest education level: None   Occupational History    None Tobacco Use    Smoking status: Never Smoker    Smokeless tobacco: None   Substance and Sexual Activity    Alcohol use: Never    Drug use: None    Sexual activity: None   Other Topics Concern    None   Social History Narrative    None     Social Determinants of Health     Financial Resource Strain:     Difficulty of Paying Living Expenses: Not on file   Food Insecurity:     Worried About Running Out of Food in the Last Year: Not on file    Galilea of Food in the Last Year: Not on file   Transportation Needs:     Lack of Transportation (Medical): Not on file    Lack of Transportation (Non-Medical): Not on file   Physical Activity:     Days of Exercise per Week: Not on file    Minutes of Exercise per Session: Not on file   Stress:     Feeling of Stress : Not on file   Social Connections:     Frequency of Communication with Friends and Family: Not on file    Frequency of Social Gatherings with Friends and Family: Not on file    Attends Holiness Services: Not on file    Active Member of [address] [place] [address] or Organizations: Not on file    Attends Club or Organization Meetings: Not on file    Marital Status: Not on file   Intimate Partner Violence:     Fear of Current or Ex-Partner: Not on file    Emotionally Abused: Not on file    Physically Abused: Not on file    Sexually Abused: Not on file   Housing Stability:     Unable to Pay for Housing in the Last Year: Not on file    Number of [place] in the Last Year: Not on file    Unstable Housing in the Last Year: Not on file       Family History:   History reviewed. No pertinent family history. REVIEW OF SYSTEMS:    ROS obtained using interpretor service:  Constitutional: Negative for chills. Negative for fever. HENT: Negative for congestion. Negative for rhinorrhea. Respiratory: Negative for cough. Negative for shortness of breath. Cardiovascular: Negative for chest pain.    Gastrointestinal: as per HPI  Genitourinary: as per HPI  Neurological: Negative for dizziness, syncope and numbness. Hematological: Does not bruise/bleed easily. PHYSICAL EXAM:  Vitals:    04/23/22 1934 04/24/22 0301 04/24/22 0520 04/24/22 0845   BP: 136/79 (!) 157/117 (!) 150/84 (!) 160/89   Pulse: 81 92 73 74   Resp: 16   16   Temp: 97.4 °F (36.3 °C) 98.2 °F (36.8 °C) 97.9 °F (36.6 °C) 97.7 °F (36.5 °C)   TempSrc: Oral   Oral   SpO2: 95%   96%   Weight:       Height:           Physical Exam  General: awake, alert, in no acute distress  HEENT: mucous membranes moist  Respiratory: normal effort  CV: appears well perfused, regular rate and rhythm  Abdomen: Soft, non-tender, non-distended. No guarding or rebound tenderness. Well healed midline surgical incision  Skin: warm and dry  Extremities: atraumatic  Neuro: no focal deficits noted  Psych: mood normal        DATA:    Lab Results   Component Value Date    WBC 8.1 04/24/2022    HGB 14.8 04/24/2022    HCT 47.1 (H) 04/24/2022    MCV 88.5 04/24/2022     04/24/2022     Lab Results   Component Value Date     04/24/2022    K 4.8 04/24/2022     04/24/2022    CO2 26 04/24/2022    BUN 12 04/24/2022    CREATININE 0.5 04/24/2022    GLUCOSE 395 04/24/2022    CALCIUM 9.2 04/24/2022      Narrative   EXAMINATION:   CT OF THE ABDOMEN AND PELVIS WITH CONTRAST 4/23/2022 2:44 pm       TECHNIQUE:   CT of the abdomen and pelvis was performed with the administration of   intravenous contrast. Multiplanar reformatted images are provided for review.    Dose modulation, iterative reconstruction, and/or weight based adjustment of   the mA/kV was utilized to reduce the radiation dose to as low as reasonably   achievable.       COMPARISON:   None.       HISTORY:   ORDERING SYSTEM PROVIDED HISTORY: LLQ pain, intermittent   TECHNOLOGIST PROVIDED HISTORY:   Additional Contrast?->None   Reason for exam:->LLQ pain, intermittent   Decision Support Exception - unselect if not a suspected or confirmed   emergency medical condition->Emergency Medical Condition (MA)   Reason for Exam: LLQ pain, intermittent   Relevant Medical/Surgical History: 75 ML ISOVUE 370       FINDINGS:   Lower Chest: No focal consolidation at the lung bases.  The heart is not   enlarged.  No pericardial effusion.       Organs:       Liver: Normal appearance of the liver.       Gallbladder: Unremarkable gallbladder. No biliary ductal dilatation       Spleen: Unremarkable spleen.       Pancreas: No peripancreatic inflammatory changes.       Adrenal Glands: There is a large heterogeneous mass and adjacent punctate   calcification in the region of the right adrenal gland measuring 5.6 x 0.6   cm.  This is age indeterminate without a comparison study.  Hyperdensity   within the mass could reflect areas of enhancement or hemorrhage.  No acute   findings of the left adrenal gland.       Kidneys: No hydronephrosis punctate stone in each kidney.  Small fat   containing lesion at the superior pole of the left kidney, most compatible   with angiomyolipoma.       GI/Bowel:       Stomach: The stomach is nondistended.       Small bowel: No evidence of small bowel obstruction.       Colon: No signficant pericolonic inflammatory changes.       Appendix: Appendix not visualized, but there are no secondary findings of   acute appendicitis.       Pelvis: Heterogeneous enlarged uterus and endocervical canal.  Fibroids or   other underlying mass not excluded.  No free fluid in the pelvis.       Peritoneum/Retroperitoneum: Atherosclerosis of the abdominal aorta.  No   retroperitoneal lymphadenopathy by CT criteria.       Bones/Soft Tissues: No acute findings within the subcutaneous soft tissues. Spondylosis of the visualized spine.           Impression   Heterogeneous mixed attenuating mass in the region of the right adrenal   gland.  Differentials include large adrenal adenoma, malignancy or possibly   angiomyolipoma.  Adrenal hemorrhage or other primary malignancy not excluded. Recommend attention on follow-up and MRI of the abdomen with and without   contrast adrenal mass protocol.       Heterogeneous masslike appearance of the uterus, likely with underlying   uterine fibroids.  Other pathology not excluded.  Recommend correlation with   pelvic ultrasound. IMPRESSION:    52 y.o. female with adrenal and uterine masses. Adrenal mass meets size criteria for surgical excision. Biochemical workup and further imaging pending. Patient Active Problem List:     Adrenal mass greater than 4 cm in diameter [facility])        PLAN:  - discussed with Dr. Moni Scott from Oncology this morning  - agree with Ob/gyn eval given uterine mass/suspected fibroids.   Pelvic US pending  - will follow adrenal mass workup- labwork and US/MRI  - will likely require right adrenalectomy depending on results of workup          Electronically signed by Gisele Langford MD on 4/24/2022 at 8:54 AM

## 2022-04-25 LAB
ALBUMIN SERPL-MCNC: 4.1 GM/DL (ref 3.4–5)
ALP BLD-CCNC: 157 IU/L (ref 40–128)
ALT SERPL-CCNC: 14 U/L (ref 10–40)
ANION GAP SERPL CALCULATED.3IONS-SCNC: 9 MMOL/L (ref 4–16)
AST SERPL-CCNC: 13 IU/L (ref 15–37)
BASOPHILS ABSOLUTE: 0 K/CU MM
BASOPHILS RELATIVE PERCENT: 0.3 % (ref 0–1)
BILIRUB SERPL-MCNC: 0.2 MG/DL (ref 0–1)
BUN BLDV-MCNC: 21 MG/DL (ref 6–23)
CALCIUM SERPL-MCNC: 9.3 MG/DL (ref 8.3–10.6)
CHLORIDE BLD-SCNC: 99 MMOL/L (ref 99–110)
CO2: 28 MMOL/L (ref 21–32)
CREAT SERPL-MCNC: 0.6 MG/DL (ref 0.6–1.1)
DIFFERENTIAL TYPE: ABNORMAL
EOSINOPHILS ABSOLUTE: 0.1 K/CU MM
EOSINOPHILS RELATIVE PERCENT: 1 % (ref 0–3)
GFR AFRICAN AMERICAN: >60 ML/MIN/1.73M2
GFR NON-AFRICAN AMERICAN: >60 ML/MIN/1.73M2
GLUCOSE BLD-MCNC: 267 MG/DL (ref 70–99)
GLUCOSE BLD-MCNC: 269 MG/DL (ref 70–99)
GLUCOSE BLD-MCNC: 323 MG/DL (ref 70–99)
GLUCOSE BLD-MCNC: 330 MG/DL (ref 70–99)
GLUCOSE BLD-MCNC: 352 MG/DL (ref 70–99)
GLUCOSE BLD-MCNC: 361 MG/DL (ref 70–99)
HCT VFR BLD CALC: 48.7 % (ref 37–47)
HEMOGLOBIN: 15.2 GM/DL (ref 12.5–16)
IMMATURE NEUTROPHIL %: 0.3 % (ref 0–0.43)
LYMPHOCYTES ABSOLUTE: 5.1 K/CU MM
LYMPHOCYTES RELATIVE PERCENT: 48.7 % (ref 24–44)
Lab: 24 HRS
MCH RBC QN AUTO: 27.9 PG (ref 27–31)
MCHC RBC AUTO-ENTMCNC: 31.2 % (ref 32–36)
MCV RBC AUTO: 89.4 FL (ref 78–100)
MONOCYTES ABSOLUTE: 0.5 K/CU MM
MONOCYTES RELATIVE PERCENT: 5.2 % (ref 0–4)
NUCLEATED RBC %: 0 %
PDW BLD-RTO: 12 % (ref 11.7–14.9)
PLATELET # BLD: 337 K/CU MM (ref 140–440)
PMV BLD AUTO: 11 FL (ref 7.5–11.1)
POTASSIUM SERPL-SCNC: 4.8 MMOL/L (ref 3.5–5.1)
RBC # BLD: 5.45 M/CU MM (ref 4.2–5.4)
SEGMENTED NEUTROPHILS ABSOLUTE COUNT: 4.7 K/CU MM
SEGMENTED NEUTROPHILS RELATIVE PERCENT: 44.5 % (ref 36–66)
SODIUM BLD-SCNC: 136 MMOL/L (ref 135–145)
TOTAL IMMATURE NEUTOROPHIL: 0.03 K/CU MM
TOTAL NUCLEATED RBC: 0 K/CU MM
TOTAL PROTEIN: 6.8 GM/DL (ref 6.4–8.2)
VOLUME, (UVOL): 2090 MLS
WBC # BLD: 10.5 K/CU MM (ref 4–10.5)

## 2022-04-25 PROCEDURE — 99232 SBSQ HOSP IP/OBS MODERATE 35: CPT | Performed by: SURGERY

## 2022-04-25 PROCEDURE — 6370000000 HC RX 637 (ALT 250 FOR IP): Performed by: INTERNAL MEDICINE

## 2022-04-25 PROCEDURE — 6360000002 HC RX W HCPCS: Performed by: NURSE PRACTITIONER

## 2022-04-25 PROCEDURE — 1200000000 HC SEMI PRIVATE

## 2022-04-25 PROCEDURE — 80053 COMPREHEN METABOLIC PANEL: CPT

## 2022-04-25 PROCEDURE — 82962 GLUCOSE BLOOD TEST: CPT

## 2022-04-25 PROCEDURE — 2580000003 HC RX 258: Performed by: NURSE PRACTITIONER

## 2022-04-25 PROCEDURE — 36415 COLL VENOUS BLD VENIPUNCTURE: CPT

## 2022-04-25 PROCEDURE — 85025 COMPLETE CBC W/AUTO DIFF WBC: CPT

## 2022-04-25 PROCEDURE — 6370000000 HC RX 637 (ALT 250 FOR IP): Performed by: NURSE PRACTITIONER

## 2022-04-25 RX ORDER — GLIPIZIDE 10 MG/1
10 TABLET ORAL
Status: DISCONTINUED | OUTPATIENT
Start: 2022-04-25 | End: 2022-04-26 | Stop reason: HOSPADM

## 2022-04-25 RX ORDER — INSULIN GLARGINE 100 [IU]/ML
50 INJECTION, SOLUTION SUBCUTANEOUS NIGHTLY
Status: DISCONTINUED | OUTPATIENT
Start: 2022-04-25 | End: 2022-04-26 | Stop reason: HOSPADM

## 2022-04-25 RX ORDER — INSULIN LISPRO 100 [IU]/ML
0-12 INJECTION, SOLUTION INTRAVENOUS; SUBCUTANEOUS
Status: DISCONTINUED | OUTPATIENT
Start: 2022-04-25 | End: 2022-04-26 | Stop reason: HOSPADM

## 2022-04-25 RX ADMIN — INSULIN LISPRO 15 UNITS: 100 INJECTION, SOLUTION INTRAVENOUS; SUBCUTANEOUS at 09:59

## 2022-04-25 RX ADMIN — INSULIN LISPRO 10 UNITS: 100 INJECTION, SOLUTION INTRAVENOUS; SUBCUTANEOUS at 17:51

## 2022-04-25 RX ADMIN — GLIPIZIDE 10 MG: 10 TABLET ORAL at 10:03

## 2022-04-25 RX ADMIN — INSULIN GLARGINE 50 UNITS: 100 INJECTION, SOLUTION SUBCUTANEOUS at 21:07

## 2022-04-25 RX ADMIN — SODIUM CHLORIDE, PRESERVATIVE FREE 10 ML: 5 INJECTION INTRAVENOUS at 10:07

## 2022-04-25 RX ADMIN — ENOXAPARIN SODIUM 40 MG: 100 INJECTION SUBCUTANEOUS at 09:58

## 2022-04-25 RX ADMIN — INSULIN LISPRO 4 UNITS: 100 INJECTION, SOLUTION INTRAVENOUS; SUBCUTANEOUS at 02:09

## 2022-04-25 RX ADMIN — GLIPIZIDE 10 MG: 10 TABLET ORAL at 17:50

## 2022-04-25 RX ADMIN — ATORVASTATIN CALCIUM 20 MG: 10 TABLET, FILM COATED ORAL at 21:06

## 2022-04-25 RX ADMIN — INSULIN LISPRO 10 UNITS: 100 INJECTION, SOLUTION INTRAVENOUS; SUBCUTANEOUS at 10:00

## 2022-04-25 RX ADMIN — INSULIN LISPRO 15 UNITS: 100 INJECTION, SOLUTION INTRAVENOUS; SUBCUTANEOUS at 12:20

## 2022-04-25 RX ADMIN — INSULIN LISPRO 6 UNITS: 100 INJECTION, SOLUTION INTRAVENOUS; SUBCUTANEOUS at 12:20

## 2022-04-25 RX ADMIN — INSULIN LISPRO 6 UNITS: 100 INJECTION, SOLUTION INTRAVENOUS; SUBCUTANEOUS at 21:12

## 2022-04-25 RX ADMIN — INSULIN LISPRO 15 UNITS: 100 INJECTION, SOLUTION INTRAVENOUS; SUBCUTANEOUS at 17:51

## 2022-04-25 RX ADMIN — LISINOPRIL 20 MG: 20 TABLET ORAL at 09:58

## 2022-04-25 RX ADMIN — SODIUM CHLORIDE, PRESERVATIVE FREE 10 ML: 5 INJECTION INTRAVENOUS at 21:06

## 2022-04-25 ASSESSMENT — PAIN SCALES - GENERAL
PAINLEVEL_OUTOF10: 0
PAINLEVEL_OUTOF10: 0

## 2022-04-25 NOTE — PROGRESS NOTES
Hospitalist Progress Note      Name:  Courtney Guerrero /Age/Sex: 1975  (52 y.o. female)   MRN & CSN:  [id number] & [id number] Admission Date/Time: 2022  9:43 AM   Location:  4110/4110-A PCP: No primary care provider on file. Hospital Day: 3    Assessment and Plan:   Courtney Guerrero is a 52 y.o.  female  who presents with Adrenal mass greater than 4 cm in diameter ([facility] [address]    Courtney Guerrero is a 52 y.o.  female  who presents with abdominal pain found to have an  Adrenal mass greater than 4 cm in diameter [facility])     Assessment and plan:      Adrenal mass: Right. Symptomatic with abdominal pain. As demonstrated on CT A/P-large heterogenous mass and adjacent puncture clarification in region of the right adrenal gland measuring 5.6 x 4.6. ER attending spoke with radiology who corrected size of mass. -General surgery/heme-onc/endocrinology evaluation appreciated. -MRI abdomen pelvis with and without contrast shows no features of adrenal adenoma but suspicious for primary adrenal neoplasia or possibly a complicated hemorrhagic adrenal cyst.  Recommend percutaneous tissue sampling.  -Await further surgery recommendations.  -Endocrinology recommended 24-hour urine for cortisol/catecholamine/metanephrine     Diabetes Mellitus type II-with hyperglycemia glucose on admission 402. No previous lab results on file unknown A1c. Patiently recently moved to [place] 9 months ago states has not taken her medication in over a month. Managed on glipizide  -Continue Lantus/SSI. Also started on Premeal insulin  - POCT glucose qACHS  - hypoglycemia management protocol   - target blood glucose 100-180  - ADA carb controlled diet  -Endocrinology on board     Uterine fibroids: As demonstrated on CT abdomen pelvis, patient states she has had those before. -Recommended referral to gynecology at discharge     Essential Hypertension- PTA regimen lisinopril. Monitor BP trends.  Target /80 or less  -continue home anti-hypertensive medication(s).     Hyperlipidemia: Continue OP statin therapy. \     Chronic Conditions: Continue all home medications except as stated above or contraindicated.       Diet ADULT DIET; Regular; 4 carb choices (60 gm/meal)   DVT Prophylaxis [x] Lovenox, []  Heparin, [] SCDs, [] Ambulation   GI Prophylaxis [] PPI,  [] H2 Blocker,  [] Carafate,  [] Diet/Tube Feeds   Code Status Full Code   Disposition Patient requires continued admission due to adrenal mass work-up   MDM [] Low, [x] Moderate,[]  High  Patient's risk as above due to adrenal mass     History of Present Illness:     Chief Complaint: Adrenal mass greater than 4 cm in diameter ([address]  Mica Beard is a 52 y.o.  female  who presents with lower left quadrant pain. Subjective    Patient seen and examined at bedside. Reports her abdominal pain is better. Blood sugar continues to be elevated in 300s. No acute overnight events noted. Ten point ROS reviewed negative, unless as noted above    Objective:   No intake or output data in the 24 hours ending 04/25/22 1134   Vitals:   Vitals:    04/25/22 0956   BP: (!) 140/81   Pulse: 74   Resp:    Temp: 98.1 °F (36.7 °C)   SpO2: 94%          No results for input(s): PH, AIQ8DYS, PO2ART, BE, QIL5VIW, CO2CT, O2SAT, LABCARB in the last 72 hours. Invalid input(s): METHGBART         Physical Exam:   GEN Awake female, sitting upright in bed in no apparent distress. Appears given age. RESP Clear to auscultation, no wheezes, rales or rhonchi. Symmetric chest movement while on room air. CARDIO/VASC S1/S2 auscultated. Regular rate. No peripheral edema. GI Abdomen is soft without significant tendernessBowel sounds are normoactive. NEURO AAO x3.   No gross focal neurological deficits    Data:   CBC with Differential:    Lab Results   Component Value Date    WBC 10.5 04/25/2022    RBC 5.45 04/25/2022    HGB 15.2 04/25/2022    HCT 48.7 04/25/2022     04/25/2022    MCV 89.4 04/25/2022 MCH 27.9 04/25/2022    MCHC 31.2 04/25/2022    RDW 12.0 04/25/2022    SEGSPCT 44.5 04/25/2022    BANDSPCT 1 04/23/2022    LYMPHOPCT 48.7 04/25/2022    MONOPCT 5.2 04/25/2022    BASOPCT 0.3 04/25/2022    MONOSABS 0.5 04/25/2022    LYMPHSABS 5.1 04/25/2022    EOSABS 0.1 04/25/2022    BASOSABS 0.0 04/25/2022    DIFFTYPE AUTOMATED DIFFERENTIAL 04/25/2022       CMP:     Lab Results   Component Value Date     04/25/2022    K 4.8 04/25/2022    CL 99 04/25/2022    CO2 28 04/25/2022    BUN 21 04/25/2022    CREATININE 0.6 04/25/2022    GFRAA >60 04/25/2022    LABGLOM >60 04/25/2022    GLUCOSE 330 04/25/2022    PROT 6.8 04/25/2022    LABALBU 4.1 04/25/2022    CALCIUM 9.3 04/25/2022    BILITOT 0.2 04/25/2022    ALKPHOS 157 04/25/2022    AST 13 04/25/2022    ALT 14 04/25/2022       Troponin:  No results found for: TROPONINT    U/A:    Lab Results   Component Value Date    COLORU STRAW 04/23/2022    PROTEINU NEGATIVE 04/23/2022    WBCUA <1 04/23/2022    RBCUA 2 04/23/2022    TRICHOMONAS NONE SEEN 04/23/2022    BACTERIA NEGATIVE 04/23/2022    CLARITYU CLEAR 04/23/2022    SPECGRAV 1.010 04/23/2022    LEUKOCYTESUR NEGATIVE 04/23/2022    UROBILINOGEN 0.2 04/23/2022    BILIRUBINUR NEGATIVE 04/23/2022    BLOODU NEGATIVE 04/23/2022       Urine Culture:  No components found for: CURINE    Radiology results:  MRI ABDOMEN W WO CONTRAST   Final Result   1. Right adrenal mass lesion does not have features of adrenal adenoma, but   has features suspicious for primary adrenal neoplasia or possibly a   complicated hemorrhagic adrenal cyst.  Features are nonspecific. Recommend   percutaneous tissue sampling. 2. Subcentimeter angiomyolipoma upper pole left kidney is a benign finding   requiring no additional evaluation or follow-up. 3. Remainder of the MRI abdomen appears unremarkable. US DUP ABD PEL RETRO SCROT COMPLETE   Final Result   1. Numerous uterine fibroids as above. 2. Normal ovaries with normal Doppler flow. Irvin Pro MD on 4/25/2022 at 11:34 AM

## 2022-04-25 NOTE — PROGRESS NOTES
GENERAL SURGERY PROGRESS NOTE    Courtney Guerrero is a 52 y.o. female with adrenal mass. Subjective:  Reports doing \"so-so\" today. Not having significant pain. Pelvic US and MRI completed. Biochemical workup pending. Objective:    Vitals: VITALS:  BP (!) 140/81   Pulse 74   Temp 98.1 °F (36.7 °C)   Resp 18   Ht 5' 7\" (1.702 m)   Wt 150 lb (68 kg)   SpO2 94%   BMI 23.49 kg/m²     I/O: No intake/output data recorded. Labs/Imaging Results:   Lab Results   Component Value Date     04/25/2022    K 4.8 04/25/2022    CL 99 04/25/2022    CO2 28 04/25/2022    BUN 21 04/25/2022    CREATININE 0.6 04/25/2022    GLUCOSE 330 04/25/2022    CALCIUM 9.3 04/25/2022      Lab Results   Component Value Date    WBC 10.5 04/25/2022    HGB 15.2 04/25/2022    HCT 48.7 (H) 04/25/2022    MCV 89.4 04/25/2022     04/25/2022       IV Fluids: dextrose    sodium chloride    Scheduled Meds:   glipiZIDE, 10 mg, Oral, BID AC    insulin glargine, 50 Units, SubCUTAneous, Nightly    insulin lispro, 0-12 Units, SubCUTAneous, 2 times per day    insulin lispro, 15 Units, SubCUTAneous, TID WC    lisinopril, 20 mg, Oral, Daily    atorvastatin, 20 mg, Oral, Nightly    sodium chloride flush, 5-40 mL, IntraVENous, 2 times per day    enoxaparin, 40 mg, SubCUTAneous, Daily    insulin lispro, 0-12 Units, SubCUTAneous, TID WC    Physical Exam:  General: A&O x 3, no distress. HEENT: Anicteric sclerae, MMM. Abdomen: Soft, nontender, nondistended. Assessment and Plan:  52 y.o. female with right adrenal mass. I had a conversation with her using the interpretor service. Her pelvic US confirmed uterine findings are most likely fibroids. MRI shows concerning findings in the adrenal gland--will need resection. She is very concerned about the uterine findings and concern whether the fibroids are contributing to her infertility.     Patient Active Problem List:     Adrenal mass greater than 4 cm in diameter [facility])      - I spoke with Dr. Latonya Hernandez, will plan robotic right adrenalectomy on 5/9 as a co-surgeon case.   Patient can discharge once her workup is complete and will have her come back on 5/9 for surgery  - will follow while admitted    Virgie Guan MD

## 2022-04-25 NOTE — CARE COORDINATION
Received a call from [address] at [facility] regarding pt's needs. Pt has no insurance, non citizen, and we have never helped pt out before. Pt advised Mecca Escalante that she ran out of her medications but unsure of what they were. Per ED notes from Meme HERNÁNDEZ, he states, The pt told that she has been off of her lisinopril atorvastatin glipizide and metformin for approximately 1 month, those were last prescribed by  In [place]. She will go on our referral list and I will continue to monitor for d/c assistance. Pt will be approved for $100 max on her voucher and we may have to look at [employer] for other assistance. Nat Alvarez

## 2022-04-25 NOTE — CARE COORDINATION
Reviewed chart and spoke pt via  [facility]as #[id number]. Pt lives with family, moved her from [place] recently. She is independent with ADL's and friends assist her with transport. She does not have a SS#, insurance or PCP. Nursing states pt ran out of medications. She confirms that is true, she could afford them but was not able to get script for here in [place]. Gave her local [place] resource handout. Pointed out [facility] and med Assist info. Spoke with lisset in Med Assist early and the can do a 1 time assistance and will have to use [facility] outpt pharmacy. Pt verbalized understand and appreciation for resources. CM available is any other needs arise.

## 2022-04-25 NOTE — PLAN OF CARE
Problem: Discharge Planning  Goal: Discharge to home or other facility with appropriate resources  Outcome: Progressing     Problem: Pain  Goal: Verbalizes/displays adequate comfort level or baseline comfort level  Outcome: Progressing     Problem: ABCDS Injury Assessment  Goal: Absence of physical injury  Outcome: Progressing     Problem: Metabolic/Fluid and Electrolytes - Adult  Goal: Electrolytes maintained within normal limits  Outcome: Progressing  Goal: Hemodynamic stability and optimal renal function maintained  Outcome: Progressing  Goal: Glucose maintained within prescribed range  Outcome: Progressing

## 2022-04-26 ENCOUNTER — APPOINTMENT (OUTPATIENT)
Dept: CT IMAGING | Age: 47
DRG: 645 | End: 2022-04-26

## 2022-04-26 VITALS
BODY MASS INDEX: 23.54 KG/M2 | TEMPERATURE: 97.8 F | WEIGHT: 150 LBS | RESPIRATION RATE: 16 BRPM | SYSTOLIC BLOOD PRESSURE: 148 MMHG | HEART RATE: 85 BPM | DIASTOLIC BLOOD PRESSURE: 113 MMHG | HEIGHT: 67 IN | OXYGEN SATURATION: 95 %

## 2022-04-26 LAB
ANION GAP SERPL CALCULATED.3IONS-SCNC: 9 MMOL/L (ref 4–16)
BUN BLDV-MCNC: 13 MG/DL (ref 6–23)
CALCIUM SERPL-MCNC: 9.2 MG/DL (ref 8.3–10.6)
CHLORIDE BLD-SCNC: 97 MMOL/L (ref 99–110)
CO2: 29 MMOL/L (ref 21–32)
CREAT SERPL-MCNC: 0.4 MG/DL (ref 0.6–1.1)
DHEAS (DHEA SULFATE): 63 UG/DL (ref 35–256)
GFR AFRICAN AMERICAN: >60 ML/MIN/1.73M2
GFR NON-AFRICAN AMERICAN: >60 ML/MIN/1.73M2
GLUCOSE BLD-MCNC: 178 MG/DL (ref 70–99)
GLUCOSE BLD-MCNC: 187 MG/DL (ref 70–99)
GLUCOSE BLD-MCNC: 261 MG/DL (ref 70–99)
GLUCOSE BLD-MCNC: 340 MG/DL (ref 70–99)
HCT VFR BLD CALC: 47.2 % (ref 37–47)
HEMOGLOBIN: 14.7 GM/DL (ref 12.5–16)
MCH RBC QN AUTO: 27.9 PG (ref 27–31)
MCHC RBC AUTO-ENTMCNC: 31.1 % (ref 32–36)
MCV RBC AUTO: 89.7 FL (ref 78–100)
PDW BLD-RTO: 11.9 % (ref 11.7–14.9)
PLATELET # BLD: 314 K/CU MM (ref 140–440)
PMV BLD AUTO: 11.7 FL (ref 7.5–11.1)
POTASSIUM SERPL-SCNC: 4.4 MMOL/L (ref 3.5–5.1)
RBC # BLD: 5.26 M/CU MM (ref 4.2–5.4)
SODIUM BLD-SCNC: 135 MMOL/L (ref 135–145)
WBC # BLD: 9.5 K/CU MM (ref 4–10.5)

## 2022-04-26 PROCEDURE — 99232 SBSQ HOSP IP/OBS MODERATE 35: CPT | Performed by: INTERNAL MEDICINE

## 2022-04-26 PROCEDURE — 99232 SBSQ HOSP IP/OBS MODERATE 35: CPT | Performed by: SURGERY

## 2022-04-26 PROCEDURE — 80048 BASIC METABOLIC PNL TOTAL CA: CPT

## 2022-04-26 PROCEDURE — 6370000000 HC RX 637 (ALT 250 FOR IP): Performed by: INTERNAL MEDICINE

## 2022-04-26 PROCEDURE — 6360000002 HC RX W HCPCS: Performed by: NURSE PRACTITIONER

## 2022-04-26 PROCEDURE — 6370000000 HC RX 637 (ALT 250 FOR IP): Performed by: NURSE PRACTITIONER

## 2022-04-26 PROCEDURE — 94761 N-INVAS EAR/PLS OXIMETRY MLT: CPT

## 2022-04-26 PROCEDURE — 36415 COLL VENOUS BLD VENIPUNCTURE: CPT

## 2022-04-26 PROCEDURE — 71250 CT THORAX DX C-: CPT

## 2022-04-26 PROCEDURE — 85027 COMPLETE CBC AUTOMATED: CPT

## 2022-04-26 PROCEDURE — 82962 GLUCOSE BLOOD TEST: CPT

## 2022-04-26 RX ORDER — ALOGLIPTIN 12.5 MG/1
25 TABLET, FILM COATED ORAL DAILY
Status: DISCONTINUED | OUTPATIENT
Start: 2022-04-26 | End: 2022-04-26 | Stop reason: HOSPADM

## 2022-04-26 RX ORDER — SIMVASTATIN 20 MG
20 TABLET ORAL NIGHTLY
Qty: 30 TABLET | Refills: 0 | Status: SHIPPED | OUTPATIENT
Start: 2022-04-26 | End: 2022-05-26

## 2022-04-26 RX ORDER — LISINOPRIL 20 MG/1
20 TABLET ORAL DAILY
Qty: 30 TABLET | Refills: 0 | Status: SHIPPED | OUTPATIENT
Start: 2022-04-26 | End: 2022-05-26

## 2022-04-26 RX ORDER — INSULIN LISPRO 100 [IU]/ML
0-18 INJECTION, SOLUTION INTRAVENOUS; SUBCUTANEOUS
Status: DISCONTINUED | OUTPATIENT
Start: 2022-04-26 | End: 2022-04-26 | Stop reason: HOSPADM

## 2022-04-26 RX ORDER — GLIPIZIDE 10 MG/1
10 TABLET ORAL
Qty: 60 TABLET | Refills: 0 | Status: SHIPPED | OUTPATIENT
Start: 2022-04-26

## 2022-04-26 RX ORDER — SYRINGE-NEEDLE,INSULIN,0.5 ML 31 GX5/16"
1 SYRINGE, EMPTY DISPOSABLE MISCELLANEOUS DAILY
Qty: 100 EACH | Refills: 3 | Status: SHIPPED | OUTPATIENT
Start: 2022-04-26

## 2022-04-26 RX ORDER — BLOOD-GLUCOSE METER
1 KIT MISCELLANEOUS DAILY
Qty: 1 KIT | Refills: 0 | Status: SHIPPED | OUTPATIENT
Start: 2022-04-26

## 2022-04-26 RX ORDER — SYRINGE-NEEDLE,INSULIN,0.5 ML 31 GX5/16"
1 SYRINGE, EMPTY DISPOSABLE MISCELLANEOUS DAILY
Qty: 100 EACH | Refills: 3 | Status: SHIPPED | OUTPATIENT
Start: 2022-04-26 | End: 2022-04-26 | Stop reason: SDUPTHER

## 2022-04-26 RX ADMIN — ALOGLIPTIN 25 MG: 12.5 TABLET, FILM COATED ORAL at 08:13

## 2022-04-26 RX ADMIN — LISINOPRIL 20 MG: 20 TABLET ORAL at 08:15

## 2022-04-26 RX ADMIN — ENOXAPARIN SODIUM 40 MG: 100 INJECTION SUBCUTANEOUS at 08:13

## 2022-04-26 RX ADMIN — GLIPIZIDE 10 MG: 10 TABLET ORAL at 06:19

## 2022-04-26 RX ADMIN — INSULIN LISPRO 15 UNITS: 100 INJECTION, SOLUTION INTRAVENOUS; SUBCUTANEOUS at 08:23

## 2022-04-26 RX ADMIN — INSULIN LISPRO 12 UNITS: 100 INJECTION, SOLUTION INTRAVENOUS; SUBCUTANEOUS at 12:03

## 2022-04-26 RX ADMIN — INSULIN LISPRO 15 UNITS: 100 INJECTION, SOLUTION INTRAVENOUS; SUBCUTANEOUS at 12:05

## 2022-04-26 RX ADMIN — INSULIN LISPRO 3 UNITS: 100 INJECTION, SOLUTION INTRAVENOUS; SUBCUTANEOUS at 08:25

## 2022-04-26 ASSESSMENT — PAIN DESCRIPTION - DESCRIPTORS: DESCRIPTORS: OTHER (COMMENT)

## 2022-04-26 ASSESSMENT — PAIN DESCRIPTION - ORIENTATION: ORIENTATION: MID

## 2022-04-26 ASSESSMENT — PAIN SCALES - WONG BAKER: WONGBAKER_NUMERICALRESPONSE: 2

## 2022-04-26 ASSESSMENT — PAIN - FUNCTIONAL ASSESSMENT: PAIN_FUNCTIONAL_ASSESSMENT: ACTIVITIES ARE NOT PREVENTED

## 2022-04-26 ASSESSMENT — PAIN SCALES - GENERAL
PAINLEVEL_OUTOF10: 2
PAINLEVEL_OUTOF10: 0

## 2022-04-26 ASSESSMENT — PAIN DESCRIPTION - LOCATION: LOCATION: CHEST

## 2022-04-26 NOTE — CARE COORDINATION
Received a call from Rashawn Carroll in the outpt pharmacy regarding pt's d/c medications. Pt has no insurance and was already on our referral list. We have not helped pt before. Voucher created for diabetic meter, strips, lancets, and faxed to the pharmacy. [employer] coupons at [employer] for Metformin $5.87, Glipizide $7.28, Simvastatin . 16 cents, Lisinopril . 34 cents.  Pt will go on the flag list. If he would need any further assistance, he must fill out an application and provide required documentation to be considered for eligibility./MB

## 2022-04-26 NOTE — PROGRESS NOTES
Hematology/Oncology  Progress Note      HISTORY OF PRESENT ILLNESS:      The patient is a 52 y.o. female with significant past medical history of DM who presents with above. She stays with friend here. She was in [place] and told to have uterine fibroid. Since she did not have money she did not pursue surgery. She has one child. 10 years ago she had abdominal surgery for ectopic pregnancy in [place]. She is from [facility]. She has felt that she were pregnant and felt movement. She has irregular menstruation. CT abdomen 4/23/2022:  Heterogeneous mixed attenuating mass in the region of the right adrenal  gland.  Differentials include large adrenal adenoma, malignancy or possibly angiomyolipoma.  Adrenal hemorrhage or other primary malignancy not excluded. Recommend attention on follow-up and MRI of the abdomen with and without contrast adrenal mass protocol.   Heterogeneous masslike appearance of the uterus, likely with underlying  uterine fibroids.  Other pathology not excluded.  Recommend correlation with pelvic ultrasound. Dr Kelli Pinzon and I saw her today and explained to her about her abnormal CT findings. Agreed with w/u to rule out functioning adrenal adenoma, MRI of the adrenal gland and pelvic US. She may need gyn evaluation also. On 4/26/2022 she looks comfortable. No acute distress. Pelvic US 4/24/2022:  1. Numerous uterine fibroids as above. 2. Normal ovaries with normal Doppler flow. MRI of abdomen 4/24/2022:  1. Right adrenal mass lesion does not have features of adrenal adenoma, but has features suspicious for primary adrenal neoplasia or possibly a  complicated hemorrhagic adrenal cyst.  Features are nonspecific.   Recommend percutaneous tissue sampling. 2. Subcentimeter angiomyolipoma upper pole left kidney is a benign finding requiring no additional evaluation or follow-up. 3. Remainder of the MRI abdomen appears unremarkable. Biochemical w/u is in progress.    Recommend CT chest for completion of w/u. May need genetic counseling as outpatient. Surgeon plans robotic right adrenalectomy on 5/9/2022. If she is discharged, please schedule follow up with us, surgeon and endocrinologist. She may need PCP. PHYSICAL EXAM:      Vitals:    BP (!) 170/98   Pulse 76   Temp 98.2 °F (36.8 °C)   Resp 18   Ht 5' 7\" (1.702 m)   Wt 150 lb (68 kg)   SpO2 97%   BMI 23.49 kg/m²     CONSTITUTIONAL:  awake, alert, cooperative and no apparent distress  EYES:  extra-ocular muscles intact and conjunctiva normal  NECK:  supple, symmetrical, trachea midline and no lymphadenopathy  LUNGS:  clear to auscultation, no crackles or wheezing  CARDIOVASCULAR:  normal S1 and S2 and no murmur noted  ABDOMEN:  normal bowel sounds, soft, non-distended and tenderness to lower abdomen  MUSCULOSKELETAL:  there is no redness, warmth, or swelling of the joints. Full range of motion noted  NEUROLOGIC:  Motor is grossly intact. cranial nerves II-XII are grossly intact  SKIN:  no bruising or bleeding and no jaundice    DATA:    Labs:  General Labs:    CBC with Differential:    Lab Results   Component Value Date    WBC 9.5 04/26/2022    RBC 5.26 04/26/2022    HGB 14.7 04/26/2022    HCT 47.2 04/26/2022     04/26/2022    MCV 89.7 04/26/2022    MCH 27.9 04/26/2022    MCHC 31.1 04/26/2022    RDW 11.9 04/26/2022    SEGSPCT 44.5 04/25/2022    BANDSPCT 1 04/23/2022    LYMPHOPCT 48.7 04/25/2022    MONOPCT 5.2 04/25/2022    BASOPCT 0.3 04/25/2022    MONOSABS 0.5 04/25/2022    LYMPHSABS 5.1 04/25/2022    EOSABS 0.1 04/25/2022    BASOSABS 0.0 04/25/2022    DIFFTYPE AUTOMATED DIFFERENTIAL 04/25/2022     CMP:    Lab Results   Component Value Date     04/26/2022    K 4.4 04/26/2022    CL 97 04/26/2022    CO2 29 04/26/2022    BUN 13 04/26/2022    CREATININE 0.4 04/26/2022    GFRAA >60 04/26/2022    LABGLOM >60 04/26/2022    GLUCOSE 261 04/26/2022    PROT 6.8 04/25/2022    LABALBU 4.1 04/25/2022    CALCIUM 9.2 04/26/2022 BILITOT 0.2 04/25/2022    ALKPHOS 157 04/25/2022    AST 13 04/25/2022    ALT 14 04/25/2022     IMPRESSION/RECOMMENDATIONS:      1. She has right adrenal mass. CT and MRI reviewed. Biochemical study is in progress. I will order CT chest for completion of the w/u. She is scheduled for right adrenalectomy on 5/9/2022. .    2. DM. On glucotrol, insulin. 3. Uterine fibroids. She may be referred to gyn as outpatient. If she is discharged, please schedule follow up with us. Will follow up.

## 2022-04-26 NOTE — PROGRESS NOTES
GENERAL SURGERY PROGRESS NOTE    Elizabeth Young is a 52 y.o. female with adrenal mass. Subjective:  Doing ok today. Plans for discharge and follow up 5/9 for robotic right adrenalectomy. Denies questions. Objective:    Vitals: VITALS:  BP (!) 148/113 Comment: RN notified  Pulse 85   Temp 97.8 °F (36.6 °C) (Oral)   Resp 16   Ht 5' 7\" (1.702 m)   Wt 150 lb (68 kg)   SpO2 95%   BMI 23.49 kg/m²     I/O: 04/25 0701 - 04/26 0700  In: 160 [P.O.:160]  Out: -     Labs/Imaging Results:   Lab Results   Component Value Date     04/26/2022    K 4.4 04/26/2022    CL 97 04/26/2022    CO2 29 04/26/2022    BUN 13 04/26/2022    CREATININE 0.4 04/26/2022    GLUCOSE 261 04/26/2022    CALCIUM 9.2 04/26/2022      Lab Results   Component Value Date    WBC 9.5 04/26/2022    HGB 14.7 04/26/2022    HCT 47.2 (H) 04/26/2022    MCV 89.7 04/26/2022     04/26/2022       IV Fluids: dextrose    sodium chloride    Scheduled Meds:   alogliptin, 25 mg, Oral, Daily    insulin lispro, 0-18 Units, SubCUTAneous, TID WC    glipiZIDE, 10 mg, Oral, BID AC    insulin glargine, 50 Units, SubCUTAneous, Nightly    insulin lispro, 0-12 Units, SubCUTAneous, 2 times per day    insulin lispro, 15 Units, SubCUTAneous, TID WC    lisinopril, 20 mg, Oral, Daily    atorvastatin, 20 mg, Oral, Nightly    sodium chloride flush, 5-40 mL, IntraVENous, 2 times per day    enoxaparin, 40 mg, SubCUTAneous, Daily    Physical Exam:  General: A&O x 3, no distress. HEENT: Anicteric sclerae, MMM. Abdomen: Soft, nontender, nondistended. Assessment and Plan:  52 y.o. female with right adrenal mass. I had a conversation with her using the interpretor service. Her pelvic US confirmed uterine findings are most likely fibroids. MRI shows concerning findings in the adrenal gland--will need resection.   She is very concerned about the uterine findings and concern whether the fibroids are contributing to her infertility.     Patient Active Problem List:     Adrenal mass greater than 4 cm in diameter [facility])    - patient request OB/gyn input on her fibroid uterus, will see if they are available to see her  - will plan robotic right adrenalectomy on 5/9   - will follow while admitted     Carlos Foy MD

## 2022-04-26 NOTE — PROGRESS NOTES
Pt glucose was 178. RN notified. Waiting on a vitals machine and then will do vitals and Head to toe assessment.

## 2022-04-26 NOTE — PROGRESS NOTES
Outpatient Pharmacy Progress Note for Meds-to-Beds    Total number of Prescriptions Filled: 3  The following medications were dispensed to the patient during the discharge process:  Glucometer  Test strips  Lancets    Additional Documentation:  Medication(s) were delivered to the patient's room prior to discharge   [facility] was able to provide DM testing supplies at no cost.  Patient does not have insurance and mediations are cheaper at [facility]. The Rx's were transferred to [facility] per request of patient and nursing staff. Thank you for letting us serve your patients.   [address] [facility]    [address], [address] [facility]    Phone: [phone]    Fax: [phone]

## 2022-04-26 NOTE — DISCHARGE SUMMARY
Discharge Summary           Name:  Tristen Hartman /Age/Sex: 1975  (52 y.o. female)   MRN & CSN:  [id number] & [id number] Admission Date/Time: 2022  9:43 AM   Attending:  Chadwick Toa MD Discharging Physician: Chadwick Tao MD     Hospital Course:   Tristen Hartman is a 52 y.o.  female past medical history of uncontrolled diabetes mellitus type 2, uterine fibroid, hypertension, hyperlipidemia, who presents with Adrenal mass greater than 4 cm in diameter ([address] Patient was symptomatic with right-sided abdominal pain. MRI abdomen pelvis with and without contrast shows no features of adrenal adenoma but suspicious for primary adrenal neoplasia or possibly a complicated hemorrhagic adrenal cyst.  Patient was seen by general surgery/hematology and oncology. Patient was also seen by endocrinology. Adenoma work-up sent. Patient to follow-up with endocrinology as outpatient. As per general surgery, patient is planned for robotic right adrenalectomy on 2022. Hematology/oncology also evaluated the patient. Awaiting pathology results postsurgery. CT chest ordered for completion by Hem/Onc. Patient was also seen by Ob-Gyn for uterine fibroids and recommend OP follow up. Patient is currently being discharged in stable condition. Discharge diagnosis    Right adrenal mass, concern for adrenal neoplasm  Diabetes mellitus type 2 with hyperglycemia  Uterine fibroids  Hypertension  Hyperlipidemia  Uterine fibroid      The patient expressed appropriate understanding of and agreement with the discharge recommendations, medications, and plan.      Consults this admission:  IP CONSULT TO CASE MANAGEMENT  IP CONSULT TO GENERAL SURGERY  IP CONSULT TO HOSPITALIST  IP CONSULT TO HEM/ONC  IP CONSULT TO ENDOCRINOLOGY  IP CONSULT TO ENDOCRINOLOGY  IP CONSULT TO OB GYN    Discharge Instruction:   Follow up appointments:   Primary care physician:  within 1 weeks  Follow-up with general surgery 2022  Follow-up with hematology/oncology in 2 weeks  Follow-up with endocrinology in 10 days    Diet:  diabetic diet   Activity: activity as tolerated  Disposition: Discharged to:   [x]Home, [][facility], []SNF, []Acute Rehab, []Hospice   Condition on discharge: Stable    Discharge Medications:        Medication List      START taking these medications    glucose 4g chewable tablet  Take 4 tablets by mouth as needed for Low blood sugar     glucose monitoring kit  1 kit by Does not apply route daily     insulin  UNIT/ML injection vial  Commonly known as: HumuLIN N  Inject 20 Units into the skin 2 times daily (before meals)     [employer] INS SYR .3CC/29G 29G X 1/2\" 0.3 ML Misc  Generic drug: Insulin Syringe-Needle U-100  1 each by Does not apply route daily        CONTINUE taking these medications    glipiZIDE 10 MG tablet  Commonly known as: GLUCOTROL  Take 1 tablet by mouth 2 times daily (before meals)     lisinopril 20 MG tablet  Commonly known as: PRINIVIL;ZESTRIL  Take 1 tablet by mouth daily     metFORMIN 500 MG tablet  Commonly known as: GLUCOPHAGE  Take 1 tablet by mouth 2 times daily (with meals)     simvastatin 20 MG tablet  Commonly known as: ZOCOR  Take 1 tablet by mouth nightly           Where to Get Your Medications      These medications were sent to [address] [place] [address] 12, [address] [facility]     Phone: [phone]   · glipiZIDE 10 MG tablet  · glucose 4g chewable tablet  · glucose monitoring kit  · lisinopril 20 MG tablet  · metFORMIN 500 MG tablet  · simvastatin 20 MG tablet     You can get these medications from any pharmacy    Bring a paper prescription for each of these medications  · insulin  UNIT/ML injection vial  · [employer] INS SYR .3CC/29G 29G X 1/2\" 0.3 ML Misc          Objective Findings at Discharge:   BP (!) 148/113 Comment: RN notified  Pulse 85   Temp 97.8 °F (36.6 °C) (Oral)   Resp 16 Ht 5' 7\" (1.702 m)   Wt 150 lb (68 kg)   SpO2 95%   BMI 23.49 kg/m²            PHYSICAL EXAM   GEN    Awake female, sitting upright in bed in no apparent distress. Appears given age. RESP  Clear to auscultation, no wheezes, rales or rhonchi. Symmetric chest movement while on room air. CARDIO/VASC           S1/S2 auscultated. Regular rate. No peripheral edema. GI        Abdomen is soft without significant tendernessBowel sounds are normoactive. NEURO           AAO x3.   No gross focal neurological deficits  BMP/CBC  Recent Labs     04/24/22  0124 04/25/22  0855 04/26/22  0053    136 135   K 4.8 4.8 4.4    99 97*   CO2 26 28 29   BUN 12 21 13   CREATININE 0.5* 0.6 0.4*   WBC 8.1 10.5 9.5   HCT 47.1* 48.7* 47.2*    337 314       Discharge Time of 35 minutes    Electronically signed by Lu Garcia MD on 4/26/2022 at 11:09 AM

## 2022-04-27 LAB
ALDOSTERONE: 7.9 NG/DL
CATECHOLAMINES FRACT 24 HOUR URINE: ABNORMAL
CREATININE URINE: 56 MG/DL
CREATININE, 24H UR: ABNORMAL MG/D (ref 700–1600)
DOPAMINE (G CRT): 238 UG/G CRT (ref 0–250)
DOPAMINE 24 HOUR URINE: ABNORMAL UG/D (ref 71–485)
DOPAMINE, URINE: 133 UG/L
EPINEPHRINE (G CRT): 262 UG/G CRT (ref 0–20)
EPINEPHRINE 24 HOUR URINE: ABNORMAL (ref 1–14)
EPINEPHRINE, URINE: 147 UG/L
NOREPINEPH (G CRT): 88 UG/G CRT (ref 0–45)
NOREPINEPHRINE 24 HOUR URINE: ABNORMAL UG/D (ref 14–120)
NOREPINEPHRINE, URINE: 49 UG/L
RENIN PLASMA: 1.6 NG/ML/HR
TIME URINE: ABNORMAL
VOLUME, (UVOL): ABNORMAL

## 2022-04-28 ENCOUNTER — TELEPHONE (OUTPATIENT)
Dept: SURGERY | Age: 47
End: 2022-04-28

## 2022-04-28 LAB
24HR URINE VOLUME (ML): 2090
CREATININE URINE: 84 MG/DL
CREATININE, 24H UR: 1756 MG/D (ref 700–1600)
TIME URINE: 24
VANILLYLMANDELIC ACID URINE: 8 MG/GCR (ref 0–6)
VMA INTERP: ABNORMAL
VMA URINE MG/ML: 6.6 MG/L
VMA, URINE: 13.8 MG/D (ref 0–7)

## 2022-04-28 RX ORDER — PHENOXYBENZAMINE HYDROCHLORIDE 10 MG/1
10 CAPSULE ORAL 2 TIMES DAILY
Qty: 60 CAPSULE | Refills: 0 | Status: ON HOLD | OUTPATIENT
Start: 2022-04-28 | End: 2022-05-11 | Stop reason: HOSPADM

## 2022-04-28 RX ORDER — METOPROLOL SUCCINATE 25 MG/1
12.5 TABLET, EXTENDED RELEASE ORAL 2 TIMES DAILY
Qty: 60 TABLET | Refills: 0 | Status: SHIPPED | OUTPATIENT
Start: 2022-05-03

## 2022-04-28 NOTE — TELEPHONE ENCOUNTER
I called [facility] and let her know her catecholemine tests were elevated and she likely has pheochromocytoma. She voiced understanding but there was some difficulty given the language barrier. I let her know she needs to begin 2 medications prior to her surgery for alpha blockade then beta blockade to prevent hypertensive crisis. She requested medications be sent to [facility].    Phenoxybenzamine was prescribed to start today and metoprolol to start in 3 days. Both should be continued through the time of surgery. Her case will likely be rescheduled to 5/10/22.     Electronically signed by Yu Hager MD on 4/28/2022 at 11:57 AM

## 2022-04-29 LAB
CREATININE URINE: 56 MG/DL
CREATININE URINE: 6262 UG/G CRT (ref 0–300)
CREATININE, 24H UR: ABNORMAL MG/D (ref 700–1600)
METANEPHRINE UF INTERPRETATION: ABNORMAL
METANEPHRINES URINE: ABNORMAL UG/D (ref 36–229)
METANEPHRINES, NMOL/L: 3507 UG/L
NORMETANEPHRINE 24 HOUR URINE: ABNORMAL UG/D (ref 95–650)
NORMETANEPHRINE URINE: 884 UG/G CRT (ref 0–400)
NORMETANEPHRINES, NMOL/L: 495 UG/L
TIME URINE: ABNORMAL
VOLUME, (UVOL): ABNORMAL

## 2022-04-30 LAB
CORTISOL (UR), FREE: 30.3 UG/D
CORTISOL URINE, FREE (/G CRT): 17.26 UG/G CRT
CORTISOL,F,UG/L,U: 14.5 UG/L
CREATININE URINE: 84 MG/DL
CREATININE, 24H UR: 1756 MG/D (ref 700–1600)
INTERPRETATION:: ABNORMAL
METANEPH/PLASMA INTERP: ABNORMAL
METANEPHRINE, PLASMA: 16.65 NMOL/L (ref 0–0.49)
NORMETANEPHRINE PLASMA: 4.22 NMOL/L (ref 0–0.89)
TIME URINE: 24
VOLUME, (UVOL): 2090

## 2022-04-30 NOTE — PROGRESS NOTES
Progress Note( Dr. Jason Wan)    Subjective:   Admit Date: 4/23/2022  PCP: No primary care provider on file. Admitted For :Left-sided abdominal pain better control of blood glucose work-up showed history of right-sided adrenal mass also diabetes mellitus       Consulted For: Right-sided abdomen mass endocrine evaluation  //better control of blood glucose      Interval History: Patient blood glucose are getting better  Overall urine being collected for endocrine evaluation of adrenal mass    Denies any chest pains,   Denies SOB . Denies nausea or vomiting. No new bowel or bladder symptoms. No intake or output data in the 24 hours ending 04/30/22 1325    DATA    CBC: No results for input(s): WBC, HGB, PLT in the last 72 hours. CMP:No results for input(s): NA, K, CL, CO2, BUN, CREATININE, GLU, CALCIUM, PROT, LABALBU, BILITOT, ALKPHOS, AST, ALT in the last 72 hours. Lipids:   Lab Results   Component Value Date    CHOL 164 04/24/2022    HDL 43 04/24/2022    TRIG 78 04/24/2022     Glucose:No results for input(s): POCGLU in the last 72 hours. [id number]:  Lab Results   Component Value Date    LABA1C 13.0 04/24/2022     High Sensitivity TSH:   Lab Results   Component Value Date    TSHHS 2.200 04/23/2022     Free T3: No results found for: FT3  Free T4:  Lab Results   Component Value Date    T4FREE 1.76 04/23/2022       CT CHEST WO CONTRAST   Final Result   1. Heterogeneous soft tissue mass enlarging the right adrenal gland   measuring 6.5 x 4.5 cm. There is a peripheral calcification. Primary   adrenal cortical carcinoma versus metastatic disease of the primary   diagnostic considerations. Tissue sampling recommended. 2.  Architectural distortion, nodular scarring and calcifications in the   right and left upper lobe most compatible with the sequelae of prior   granulomatous infection. 3.  No evidence of mediastinal adenopathy. MRI ABDOMEN W WO CONTRAST   Final Result   1.  Right adrenal mass lesion does not have features of adrenal adenoma, but   has features suspicious for primary adrenal neoplasia or possibly a   complicated hemorrhagic adrenal cyst.  Features are nonspecific. Recommend   percutaneous tissue sampling. 2. Subcentimeter angiomyolipoma upper pole left kidney is a benign finding   requiring no additional evaluation or follow-up. 3. Remainder of the MRI abdomen appears unremarkable. US DUP ABD PEL RETRO SCROT COMPLETE   Final Result   1. Numerous uterine fibroids as above. 2. Normal ovaries with normal Doppler flow. US PELVIS COMPLETE   Final Result   1. Numerous uterine fibroids as above. 2. Normal ovaries with normal Doppler flow. CT ABDOMEN PELVIS W IV CONTRAST Additional Contrast? None   Final Result   Addendum 1 of 1   ADDENDUM:   The original report mistakenly reports a dimension of the right adrenal    mass   is 0.6 cm. The right adrenal mass measures approximately 6.2 x 4.6 x 6.2    cm. Final   Heterogeneous mixed attenuating mass in the region of the right adrenal   gland. Differentials include large adrenal adenoma, malignancy or possibly   angiomyolipoma. Adrenal hemorrhage or other primary malignancy not excluded. Recommend attention on follow-up and MRI of the abdomen with and without   contrast adrenal mass protocol. Heterogeneous masslike appearance of the uterus, likely with underlying   uterine fibroids. Other pathology not excluded. Recommend correlation with   pelvic ultrasound.               Scheduled Medicines   Medications:    Infusions:       Objective:   Vitals: BP (!) 148/113 Comment: RN notified  Pulse 85   Temp 97.8 °F (36.6 °C) (Oral)   Resp 16   Ht 5' 7\" (1.702 m)   Wt 150 lb (68 kg)   SpO2 95%   BMI 23.49 kg/m²   General appearance: alert and cooperative with exam  Neck: no JVD or bruit  Thyroid : Normal lobes   Lungs: Has Vesicular Breath sounds   Heart:  regular rate and rhythm  Abdomen: soft, non-tender; bowel sounds normal; no masses,  no organomegaly  Musculoskeletal: Normal  Extremities: extremities normal, , no edema  Neurologic:  Awake, alert, oriented to name, place and time. Cranial nerves II-XII are grossly intact. Motor is  intact. Sensory is intact. ,  and gait is normal.    Assessment:     Patient Active Problem List:     Adrenal Diabetes mellitus      Hypertensionmass greater than 4 cm in diameter ([facility] [address]           Plan:     1. Reviewed POC blood glucose . Labs and X ray results   2. Reviewed Current Medicines   3. On meal/ Correction bolus Humalog/ Basal Lantus Insulin regime / and Oral Hypoglycemic drugs   4. Patient was discharged home after he was completed following the office   5. patient needs biopsy of right adrenal l mass  6. Monitor Blood glucose frequently   7. Modified  the dose of Insulin/ other medicines as needed  8. 24-hour urine being collected for adrenal hormones  9. Will follow   10.      Francisco Alexandre MD, MD

## 2022-04-30 NOTE — PROGRESS NOTES
Progress Note( Dr. Severo Massy)    Subjective:   Admit Date: 4/23/2022  PCP: No primary care provider on file. Admitted For :Left-sided abdominal pain better control of blood glucose work-up showed history of right-sided adrenal mass also diabetes mellitus       Consulted For: Right-sided abdomen mass endocrine evaluation  //better control of blood glucose      Interval History: Patient blood glucose are getting better  Overall urine being completed for endocrine evaluation of adrenal mass    Denies any chest pains,   Denies SOB . Denies nausea or vomiting. No new bowel or bladder symptoms. No intake or output data in the 24 hours ending 04/30/22 1321    DATA    CBC: No results for input(s): WBC, HGB, PLT in the last 72 hours. CMP:No results for input(s): NA, K, CL, CO2, BUN, CREATININE, GLU, CALCIUM, PROT, LABALBU, BILITOT, ALKPHOS, AST, ALT in the last 72 hours. Lipids:   Lab Results   Component Value Date    CHOL 164 04/24/2022    HDL 43 04/24/2022    TRIG 78 04/24/2022     Glucose:No results for input(s): POCGLU in the last 72 hours. [id number]:  Lab Results   Component Value Date    LABA1C 13.0 04/24/2022     High Sensitivity TSH:   Lab Results   Component Value Date    TSHHS 2.200 04/23/2022     Free T3: No results found for: FT3  Free T4:  Lab Results   Component Value Date    T4FREE 1.76 04/23/2022       CT CHEST WO CONTRAST   Final Result   1. Heterogeneous soft tissue mass enlarging the right adrenal gland   measuring 6.5 x 4.5 cm. There is a peripheral calcification. Primary   adrenal cortical carcinoma versus metastatic disease of the primary   diagnostic considerations. Tissue sampling recommended. 2.  Architectural distortion, nodular scarring and calcifications in the   right and left upper lobe most compatible with the sequelae of prior   granulomatous infection. 3.  No evidence of mediastinal adenopathy. MRI ABDOMEN W WO CONTRAST   Final Result   1.  Right adrenal mass lesion does not have features of adrenal adenoma, but   has features suspicious for primary adrenal neoplasia or possibly a   complicated hemorrhagic adrenal cyst.  Features are nonspecific. Recommend   percutaneous tissue sampling. 2. Subcentimeter angiomyolipoma upper pole left kidney is a benign finding   requiring no additional evaluation or follow-up. 3. Remainder of the MRI abdomen appears unremarkable. US DUP ABD PEL RETRO SCROT COMPLETE   Final Result   1. Numerous uterine fibroids as above. 2. Normal ovaries with normal Doppler flow. US PELVIS COMPLETE   Final Result   1. Numerous uterine fibroids as above. 2. Normal ovaries with normal Doppler flow. CT ABDOMEN PELVIS W IV CONTRAST Additional Contrast? None   Final Result   Addendum 1 of 1   ADDENDUM:   The original report mistakenly reports a dimension of the right adrenal    mass   is 0.6 cm. The right adrenal mass measures approximately 6.2 x 4.6 x 6.2    cm. Final   Heterogeneous mixed attenuating mass in the region of the right adrenal   gland. Differentials include large adrenal adenoma, malignancy or possibly   angiomyolipoma. Adrenal hemorrhage or other primary malignancy not excluded. Recommend attention on follow-up and MRI of the abdomen with and without   contrast adrenal mass protocol. Heterogeneous masslike appearance of the uterus, likely with underlying   uterine fibroids. Other pathology not excluded. Recommend correlation with   pelvic ultrasound.               Scheduled Medicines   Medications:    Infusions:       Objective:   Vitals: BP (!) 148/113 Comment: RN notified  Pulse 85   Temp 97.8 °F (36.6 °C) (Oral)   Resp 16   Ht 5' 7\" (1.702 m)   Wt 150 lb (68 kg)   SpO2 95%   BMI 23.49 kg/m²   General appearance: alert and cooperative with exam  Neck: no JVD or bruit  Thyroid : Normal lobes   Lungs: Has Vesicular Breath sounds   Heart:  regular rate and rhythm  Abdomen: soft, non-tender; bowel sounds normal; no masses,  no organomegaly  Musculoskeletal: Normal  Extremities: extremities normal, , no edema  Neurologic:  Awake, alert, oriented to name, place and time. Cranial nerves II-XII are grossly intact. Motor is  intact. Sensory is intact. ,  and gait is normal.    Assessment:     Patient Active Problem List:     Adrenal Diabetes mellitus      Hypertensionmass greater than 4 cm in diameter ([facility] [address]           Plan:     1. Reviewed POC blood glucose . Labs and X ray results   2. Reviewed Current Medicines   3. On meal/ Correction bolus Humalog/ Basal Lantus Insulin regime / and Oral Hypoglycemic drugs   4. Patient was discharged home after he was completed following the office   5. patient needs biopsy of right adrenal l mass  6. Monitor Blood glucose frequently   7. Modified  the dose of Insulin/ other medicines as needed   8. Will follow     .      David Shannon MD, MD

## 2022-05-01 LAB
CATECHOLAMINES FRACT 24 HOUR URINE: ABNORMAL
CREATININE URINE: 4918 UG/G CRT (ref 0–300)
CREATININE URINE: 84 MG/DL
CREATININE URINE: 84 MG/DL
CREATININE, 24H UR: 1756 MG/D (ref 700–1600)
CREATININE, 24H UR: 1756 MG/D (ref 700–1600)
DOPAMINE (G CRT): 187 UG/G CRT (ref 0–250)
DOPAMINE 24 HOUR URINE: 328 UG/D (ref 71–485)
DOPAMINE, URINE: 157 UG/L
EPINEPHRINE (G CRT): 205 UG/G CRT (ref 0–20)
EPINEPHRINE 24 HOUR URINE: 359 UG/D (ref 1–14)
EPINEPHRINE, URINE: 172 UG/L
METANEPHRINE UF INTERPRETATION: ABNORMAL
METANEPHRINES URINE: 8634 UG/D (ref 36–229)
METANEPHRINES, NMOL/L: 4131 UG/L
NOREPINEPH (G CRT): 50 UG/G CRT (ref 0–45)
NOREPINEPHRINE 24 HOUR URINE: 88 UG/D (ref 14–120)
NOREPINEPHRINE, URINE: 42 UG/L
NORMETANEPHRINE 24 HOUR URINE: 1267 UG/D (ref 95–650)
NORMETANEPHRINE URINE: 721 UG/G CRT (ref 0–400)
NORMETANEPHRINES, NMOL/L: 606 UG/L
TIME URINE: 24
TIME URINE: 24
VOLUME, (UVOL): 2090
VOLUME, (UVOL): 2090

## 2022-05-05 ENCOUNTER — ANESTHESIA EVENT (OUTPATIENT)
Dept: OPERATING ROOM | Age: 47
DRG: 615 | End: 2022-05-05

## 2022-05-05 NOTE — ANESTHESIA PRE PROCEDURE
Department of Anesthesiology  Preprocedure Note       Name:  Yana Beard   Age:  52 y.o.  :  1975                                          MRN:  [id number]         Date:  2022      Surgeon: Viri Elder):  Dagmar Cho MD    Procedure: Procedure(s):  RIGHT ADRENALECTOMY LAPAROSCOPIC ROBOTIC    Medications prior to admission:   Prior to Admission medications    Medication Sig Start Date End Date Taking? Authorizing Provider   phenoxybenzamine (DIBENZYLINE) 10 MG capsule Take 1 capsule by mouth 2 times daily 22   Mauro Lorenzana MD   metoprolol succinate (TOPROL XL) 25 MG extended release tablet Take 0.5 tablets by mouth in the morning and at bedtime 5/3/22   Mauro Lorenzana MD   glipiZIDE (GLUCOTROL) 10 MG tablet Take 1 tablet by mouth 2 times daily (before meals) 22   Arlen Fuentes MD   glucose 4g chewable tablet Take 4 tablets by mouth as needed for Low blood sugar 22   Arlen Fuentes MD   metFORMIN (GLUCOPHAGE) 500 MG tablet Take 1 tablet by mouth 2 times daily (with meals) 22  Arlen Fuentes MD   lisinopril (PRINIVIL;ZESTRIL) 20 MG tablet Take 1 tablet by mouth daily 22  Arlen Fuentes MD   simvastatin (ZOCOR) 20 MG tablet Take 1 tablet by mouth nightly 22  Arlen Fuentes MD   glucose monitoring (FREESTYLE FREEDOM) kit 1 kit by Does not apply route daily 22   Arlen Fuentes MD   Insulin Syringe-Needle U-100 ([employer] INS SYR .3CC/29G) 29G X 1/2\" 0.3 ML MISC 1 each by Does not apply route daily 22   Arlen Fuentes MD   insulin NPH (HUMULIN N) 100 UNIT/ML injection vial Inject 20 Units into the skin 2 times daily (before meals) 4/26/22 6/15/22  Arlen Fuentes MD       Current medications:    No current facility-administered medications for this encounter.      Current Outpatient Medications   Medication Sig Dispense Refill    phenoxybenzamine (DIBENZYLINE) 10 MG capsule Take 1 capsule by mouth 2 times daily 60 capsule 0    metoprolol succinate (TOPROL XL) 25 MG extended release tablet Take 0.5 tablets by mouth in the morning and at bedtime 60 tablet 0    glipiZIDE (GLUCOTROL) 10 MG tablet Take 1 tablet by mouth 2 times daily (before meals) 60 tablet 0    glucose 4g chewable tablet Take 4 tablets by mouth as needed for Low blood sugar 60 tablet 0    metFORMIN (GLUCOPHAGE) 500 MG tablet Take 1 tablet by mouth 2 times daily (with meals) 60 tablet 0    lisinopril (PRINIVIL;ZESTRIL) 20 MG tablet Take 1 tablet by mouth daily 30 tablet 0    simvastatin (ZOCOR) 20 MG tablet Take 1 tablet by mouth nightly 30 tablet 0    glucose monitoring (FREESTYLE FREEDOM) kit 1 kit by Does not apply route daily 1 kit 0    Insulin Syringe-Needle U-100 ([employer] INS SYR .3CC/29G) 29G X 1/2\" 0.3 ML MISC 1 each by Does not apply route daily 100 each 3    insulin NPH (HUMULIN N) 100 UNIT/ML injection vial Inject 20 Units into the skin 2 times daily (before meals) 10 mL 1       Allergies:  No Known Allergies    Problem List:    Patient Active Problem List   Diagnosis Code    Adrenal mass greater than 4 cm in diameter (HCC) E27.8       Past Medical History:        Diagnosis Date    Hyperlipidemia     Hypertension     Tubal ectopic pregnancy     Type 2 diabetes mellitus without complication, without long-term current use of insulin (HCC)     Uterine fibroid        Past Surgical History:        Procedure Laterality Date    ECTOPIC PREGNANCY SURGERY N/A        Social History:    Social History     Tobacco Use    Smoking status: Never Smoker    Smokeless tobacco: Never Used   Substance Use Topics    Alcohol use: Never                                Counseling given: Not Answered      Vital Signs (Current):   Vitals:    05/04/22 1156   Weight: 150 lb (68 kg)   Height: 5' 7\" (1.702 m)                                              BP Readings from Last 3 Encounters:   04/26/22 (!) 148/113       NPO Status: BMI:   Wt Readings from Last 3 Encounters:   04/23/22 150 lb (68 kg)     Body mass index is 23.49 kg/m². CBC:   Lab Results   Component Value Date    WBC 9.5 04/26/2022    RBC 5.26 04/26/2022    HGB 14.7 04/26/2022    HCT 47.2 04/26/2022    MCV 89.7 04/26/2022    RDW 11.9 04/26/2022     04/26/2022       CMP:   Lab Results   Component Value Date     04/26/2022    K 4.4 04/26/2022    CL 97 04/26/2022    CO2 29 04/26/2022    BUN 13 04/26/2022    CREATININE 0.4 04/26/2022    GFRAA >60 04/26/2022    LABGLOM >60 04/26/2022    GLUCOSE 261 04/26/2022    PROT 6.8 04/25/2022    CALCIUM 9.2 04/26/2022    BILITOT 0.2 04/25/2022    ALKPHOS 157 04/25/2022    AST 13 04/25/2022    ALT 14 04/25/2022       POC Tests: No results for input(s): POCGLU, POCNA, POCK, POCCL, POCBUN, POCHEMO, POCHCT in the last 72 hours. Coags: No results found for: PROTIME, INR, APTT    HCG (If Applicable): No results found for: PREGTESTUR, PREGSERUM, HCG, HCGQUANT     ABGs: No results found for: PHART, PO2ART, RCI8FXJ, SON5OMQ, BEART, Z4ZUUUYU     Type & Screen (If Applicable):  No results found for: LABABO, LABRH    Drug/Infectious Status (If Applicable):  No results found for: HIV, HEPCAB    COVID-19 Screening (If Applicable): No results found for: COVID19        Anesthesia Evaluation  Patient summary reviewed  Airway: Mallampati: II  TM distance: >3 FB   Neck ROM: full  Mouth opening: > = 3 FB Dental:          Pulmonary: breath sounds clear to auscultation                             Cardiovascular:    (+) hypertension:, hyperlipidemia        Rhythm: regular             Beta Blocker:  Dose within 24 Hrs         Neuro/Psych:               GI/Hepatic/Renal:   (+) renal disease (adrenal):,           Endo/Other:    (+) DiabetesType II DM, using insulin, electrolyte abnormalities, .                   ROS comment: CT chest 4/2022    1.  Heterogeneous soft tissue mass enlarging the right adrenal gland  measuring 6.5 x 4.5 cm. Mariana Manan is a peripheral calcification.  Primary  adrenal cortical carcinoma versus metastatic disease of the primary  diagnostic considerations.  Tissue sampling recommended.     2.  Architectural distortion, nodular scarring and calcifications in the  right and left upper lobe most compatible with the sequelae of prior  granulomatous infection.     3.  No evidence of mediastinal adenopathy. Abdominal:             Vascular: Other Findings:           Anesthesia Plan      general     ASA 3     (Consented or elma prn via )  Induction: intravenous. MIPS: Postoperative opioids intended and Prophylactic antiemetics administered. Anesthetic plan and risks discussed with patient. Plan discussed with CRNA. Attending anesthesiologist reviewed and agrees with Preprocedure content          THAO Thompson - CRNA   5/5/2022    Pre Anesthesia Evaluation complete. Anesthesia plan, risks, benefits, alternatives, and personnel discussed with patient and/or legal guardian. Patient and/or legal guardian verbalized an understanding and agreed to proceed. Anesthesia plan discussed with care team members and agreed upon.   Anastasiia Medeiros APRN - CRNA  5/9/2022

## 2022-05-09 ENCOUNTER — HOSPITAL ENCOUNTER (INPATIENT)
Age: 47
LOS: 2 days | Discharge: HOME | DRG: 615 | End: 2022-05-11
Attending: SURGERY | Admitting: SURGERY

## 2022-05-09 ENCOUNTER — ANESTHESIA (OUTPATIENT)
Dept: OPERATING ROOM | Age: 47
DRG: 615 | End: 2022-05-09

## 2022-05-09 VITALS
TEMPERATURE: 98.6 F | OXYGEN SATURATION: 100 % | DIASTOLIC BLOOD PRESSURE: 60 MMHG | RESPIRATION RATE: 13 BRPM | SYSTOLIC BLOOD PRESSURE: 102 MMHG

## 2022-05-09 DIAGNOSIS — E27.8 ADRENAL MASS GREATER THAN 4 CM IN DIAMETER (HCC): ICD-10-CM

## 2022-05-09 DIAGNOSIS — Z01.818 ENCOUNTER FOR PREADMISSION TESTING: ICD-10-CM

## 2022-05-09 DIAGNOSIS — D35.01 PHEOCHROMOCYTOMA, RIGHT: Primary | ICD-10-CM

## 2022-05-09 LAB
GLUCOSE BLD-MCNC: 138 MG/DL (ref 70–99)
GLUCOSE BLD-MCNC: 280 MG/DL (ref 70–99)
PREGNANCY TEST URINE, POC: NEGATIVE

## 2022-05-09 PROCEDURE — 7100000000 HC PACU RECOVERY - FIRST 15 MIN: Performed by: SURGERY

## 2022-05-09 PROCEDURE — 0GT34ZZ RESECTION OF RIGHT ADRENAL GLAND, PERCUTANEOUS ENDOSCOPIC APPROACH: ICD-10-PCS | Performed by: SURGERY

## 2022-05-09 PROCEDURE — 82962 GLUCOSE BLOOD TEST: CPT

## 2022-05-09 PROCEDURE — S2900 ROBOTIC SURGICAL SYSTEM: HCPCS | Performed by: SURGERY

## 2022-05-09 PROCEDURE — 2500000003 HC RX 250 WO HCPCS: Performed by: SURGERY

## 2022-05-09 PROCEDURE — 6360000002 HC RX W HCPCS: Performed by: SURGERY

## 2022-05-09 PROCEDURE — 2709999900 HC NON-CHARGEABLE SUPPLY: Performed by: SURGERY

## 2022-05-09 PROCEDURE — 2720000010 HC SURG SUPPLY STERILE: Performed by: SURGERY

## 2022-05-09 PROCEDURE — 88307 TISSUE EXAM BY PATHOLOGIST: CPT

## 2022-05-09 PROCEDURE — 3700000000 HC ANESTHESIA ATTENDED CARE: Performed by: SURGERY

## 2022-05-09 PROCEDURE — 1200000000 HC SEMI PRIVATE

## 2022-05-09 PROCEDURE — 3600000009 HC SURGERY ROBOT BASE: Performed by: SURGERY

## 2022-05-09 PROCEDURE — 88342 IMHCHEM/IMCYTCHM 1ST ANTB: CPT

## 2022-05-09 PROCEDURE — 2500000003 HC RX 250 WO HCPCS: Performed by: NURSE ANESTHETIST, CERTIFIED REGISTERED

## 2022-05-09 PROCEDURE — 6370000000 HC RX 637 (ALT 250 FOR IP): Performed by: PHYSICIAN ASSISTANT

## 2022-05-09 PROCEDURE — 3600000019 HC SURGERY ROBOT ADDTL 15MIN: Performed by: SURGERY

## 2022-05-09 PROCEDURE — 8E0W4CZ ROBOTIC ASSISTED PROCEDURE OF TRUNK REGION, PERCUTANEOUS ENDOSCOPIC APPROACH: ICD-10-PCS | Performed by: SURGERY

## 2022-05-09 PROCEDURE — 81025 URINE PREGNANCY TEST: CPT

## 2022-05-09 PROCEDURE — 88341 IMHCHEM/IMCYTCHM EA ADD ANTB: CPT

## 2022-05-09 PROCEDURE — 2580000003 HC RX 258: Performed by: PHYSICIAN ASSISTANT

## 2022-05-09 PROCEDURE — 3700000001 HC ADD 15 MINUTES (ANESTHESIA): Performed by: SURGERY

## 2022-05-09 PROCEDURE — 2580000003 HC RX 258: Performed by: NURSE ANESTHETIST, CERTIFIED REGISTERED

## 2022-05-09 PROCEDURE — 6360000002 HC RX W HCPCS: Performed by: PHYSICIAN ASSISTANT

## 2022-05-09 PROCEDURE — 7100000001 HC PACU RECOVERY - ADDTL 15 MIN: Performed by: SURGERY

## 2022-05-09 PROCEDURE — 60650 LAPAROSCOPY ADRENALECTOMY: CPT | Performed by: SURGERY

## 2022-05-09 PROCEDURE — 60650 LAPAROSCOPY ADRENALECTOMY: CPT | Performed by: PHYSICIAN ASSISTANT

## 2022-05-09 PROCEDURE — 2580000003 HC RX 258: Performed by: ANESTHESIOLOGY

## 2022-05-09 PROCEDURE — APPNB180 APP NON BILLABLE TIME > 60 MINS: Performed by: PHYSICIAN ASSISTANT

## 2022-05-09 PROCEDURE — 6360000002 HC RX W HCPCS: Performed by: NURSE ANESTHETIST, CERTIFIED REGISTERED

## 2022-05-09 RX ORDER — LISINOPRIL 20 MG/1
20 TABLET ORAL DAILY
Status: DISCONTINUED | OUTPATIENT
Start: 2022-05-09 | End: 2022-05-11 | Stop reason: HOSPADM

## 2022-05-09 RX ORDER — OXYCODONE HYDROCHLORIDE 5 MG/1
5 TABLET ORAL EVERY 4 HOURS PRN
Status: DISCONTINUED | OUTPATIENT
Start: 2022-05-09 | End: 2022-05-11 | Stop reason: HOSPADM

## 2022-05-09 RX ORDER — SODIUM CHLORIDE 0.9 % (FLUSH) 0.9 %
5-40 SYRINGE (ML) INJECTION PRN
Status: DISCONTINUED | OUTPATIENT
Start: 2022-05-09 | End: 2022-05-11 | Stop reason: HOSPADM

## 2022-05-09 RX ORDER — SODIUM CHLORIDE, SODIUM LACTATE, POTASSIUM CHLORIDE, CALCIUM CHLORIDE 600; 310; 30; 20 MG/100ML; MG/100ML; MG/100ML; MG/100ML
INJECTION, SOLUTION INTRAVENOUS CONTINUOUS
Status: DISCONTINUED | OUTPATIENT
Start: 2022-05-09 | End: 2022-05-09 | Stop reason: HOSPADM

## 2022-05-09 RX ORDER — SODIUM CHLORIDE, SODIUM LACTATE, POTASSIUM CHLORIDE, CALCIUM CHLORIDE 600; 310; 30; 20 MG/100ML; MG/100ML; MG/100ML; MG/100ML
INJECTION, SOLUTION INTRAVENOUS CONTINUOUS PRN
Status: DISCONTINUED | OUTPATIENT
Start: 2022-05-09 | End: 2022-05-09 | Stop reason: SDUPTHER

## 2022-05-09 RX ORDER — HYDRALAZINE HYDROCHLORIDE 20 MG/ML
10 INJECTION INTRAMUSCULAR; INTRAVENOUS EVERY 6 HOURS PRN
Status: DISCONTINUED | OUTPATIENT
Start: 2022-05-09 | End: 2022-05-11 | Stop reason: HOSPADM

## 2022-05-09 RX ORDER — OXYCODONE HYDROCHLORIDE 5 MG/1
10 TABLET ORAL PRN
Status: DISCONTINUED | OUTPATIENT
Start: 2022-05-09 | End: 2022-05-09 | Stop reason: HOSPADM

## 2022-05-09 RX ORDER — BUPIVACAINE HYDROCHLORIDE 5 MG/ML
INJECTION, SOLUTION EPIDURAL; INTRACAUDAL
Status: COMPLETED | OUTPATIENT
Start: 2022-05-09 | End: 2022-05-09

## 2022-05-09 RX ORDER — METOPROLOL SUCCINATE 25 MG/1
12.5 TABLET, EXTENDED RELEASE ORAL 2 TIMES DAILY
Status: DISCONTINUED | OUTPATIENT
Start: 2022-05-09 | End: 2022-05-11 | Stop reason: HOSPADM

## 2022-05-09 RX ORDER — ONDANSETRON 2 MG/ML
4 INJECTION INTRAMUSCULAR; INTRAVENOUS
Status: DISCONTINUED | OUTPATIENT
Start: 2022-05-09 | End: 2022-05-09 | Stop reason: HOSPADM

## 2022-05-09 RX ORDER — SODIUM CHLORIDE 9 MG/ML
INJECTION, SOLUTION INTRAVENOUS PRN
Status: DISCONTINUED | OUTPATIENT
Start: 2022-05-09 | End: 2022-05-11 | Stop reason: HOSPADM

## 2022-05-09 RX ORDER — ONDANSETRON 2 MG/ML
4 INJECTION INTRAMUSCULAR; INTRAVENOUS EVERY 6 HOURS PRN
Status: DISCONTINUED | OUTPATIENT
Start: 2022-05-09 | End: 2022-05-11 | Stop reason: HOSPADM

## 2022-05-09 RX ORDER — HYDRALAZINE HYDROCHLORIDE 20 MG/ML
INJECTION INTRAMUSCULAR; INTRAVENOUS PRN
Status: DISCONTINUED | OUTPATIENT
Start: 2022-05-09 | End: 2022-05-09 | Stop reason: SDUPTHER

## 2022-05-09 RX ORDER — FENTANYL CITRATE 50 UG/ML
INJECTION, SOLUTION INTRAMUSCULAR; INTRAVENOUS PRN
Status: DISCONTINUED | OUTPATIENT
Start: 2022-05-09 | End: 2022-05-09 | Stop reason: SDUPTHER

## 2022-05-09 RX ORDER — FENTANYL CITRATE 50 UG/ML
25 INJECTION, SOLUTION INTRAMUSCULAR; INTRAVENOUS EVERY 5 MIN PRN
Status: DISCONTINUED | OUTPATIENT
Start: 2022-05-09 | End: 2022-05-09 | Stop reason: HOSPADM

## 2022-05-09 RX ORDER — ONDANSETRON 4 MG/1
4 TABLET, ORALLY DISINTEGRATING ORAL EVERY 8 HOURS PRN
Status: DISCONTINUED | OUTPATIENT
Start: 2022-05-09 | End: 2022-05-11 | Stop reason: HOSPADM

## 2022-05-09 RX ORDER — SODIUM CHLORIDE 9 MG/ML
25 INJECTION, SOLUTION INTRAVENOUS PRN
Status: DISCONTINUED | OUTPATIENT
Start: 2022-05-09 | End: 2022-05-09 | Stop reason: HOSPADM

## 2022-05-09 RX ORDER — ESMOLOL HYDROCHLORIDE 10 MG/ML
INJECTION INTRAVENOUS PRN
Status: DISCONTINUED | OUTPATIENT
Start: 2022-05-09 | End: 2022-05-09 | Stop reason: SDUPTHER

## 2022-05-09 RX ORDER — PHENOXYBENZAMINE HYDROCHLORIDE 10 MG/1
10 CAPSULE ORAL 2 TIMES DAILY
Status: DISCONTINUED | OUTPATIENT
Start: 2022-05-09 | End: 2022-05-10

## 2022-05-09 RX ORDER — SODIUM CHLORIDE 0.9 % (FLUSH) 0.9 %
5-40 SYRINGE (ML) INJECTION EVERY 12 HOURS SCHEDULED
Status: DISCONTINUED | OUTPATIENT
Start: 2022-05-09 | End: 2022-05-11 | Stop reason: HOSPADM

## 2022-05-09 RX ORDER — LABETALOL HYDROCHLORIDE 5 MG/ML
10 INJECTION, SOLUTION INTRAVENOUS
Status: DISCONTINUED | OUTPATIENT
Start: 2022-05-09 | End: 2022-05-09 | Stop reason: HOSPADM

## 2022-05-09 RX ORDER — DEXAMETHASONE SODIUM PHOSPHATE 4 MG/ML
INJECTION, SOLUTION INTRA-ARTICULAR; INTRALESIONAL; INTRAMUSCULAR; INTRAVENOUS; SOFT TISSUE PRN
Status: DISCONTINUED | OUTPATIENT
Start: 2022-05-09 | End: 2022-05-09 | Stop reason: SDUPTHER

## 2022-05-09 RX ORDER — DROPERIDOL 2.5 MG/ML
0.62 INJECTION, SOLUTION INTRAMUSCULAR; INTRAVENOUS
Status: DISCONTINUED | OUTPATIENT
Start: 2022-05-09 | End: 2022-05-09 | Stop reason: HOSPADM

## 2022-05-09 RX ORDER — GLIPIZIDE 5 MG/1
10 TABLET ORAL
Status: DISCONTINUED | OUTPATIENT
Start: 2022-05-10 | End: 2022-05-11 | Stop reason: HOSPADM

## 2022-05-09 RX ORDER — OXYCODONE HYDROCHLORIDE 5 MG/1
5 TABLET ORAL PRN
Status: DISCONTINUED | OUTPATIENT
Start: 2022-05-09 | End: 2022-05-09 | Stop reason: HOSPADM

## 2022-05-09 RX ORDER — INSULIN LISPRO 100 [IU]/ML
0-6 INJECTION, SOLUTION INTRAVENOUS; SUBCUTANEOUS NIGHTLY
Status: DISCONTINUED | OUTPATIENT
Start: 2022-05-09 | End: 2022-05-11 | Stop reason: HOSPADM

## 2022-05-09 RX ORDER — SODIUM CHLORIDE 9 MG/ML
INJECTION, SOLUTION INTRAVENOUS CONTINUOUS
Status: DISCONTINUED | OUTPATIENT
Start: 2022-05-09 | End: 2022-05-10

## 2022-05-09 RX ORDER — HYDRALAZINE HYDROCHLORIDE 20 MG/ML
10 INJECTION INTRAMUSCULAR; INTRAVENOUS
Status: DISCONTINUED | OUTPATIENT
Start: 2022-05-09 | End: 2022-05-09 | Stop reason: HOSPADM

## 2022-05-09 RX ORDER — ROCURONIUM BROMIDE 10 MG/ML
INJECTION, SOLUTION INTRAVENOUS PRN
Status: DISCONTINUED | OUTPATIENT
Start: 2022-05-09 | End: 2022-05-09 | Stop reason: SDUPTHER

## 2022-05-09 RX ORDER — SODIUM CHLORIDE 0.9 % (FLUSH) 0.9 %
5-40 SYRINGE (ML) INJECTION EVERY 12 HOURS SCHEDULED
Status: DISCONTINUED | OUTPATIENT
Start: 2022-05-09 | End: 2022-05-09 | Stop reason: HOSPADM

## 2022-05-09 RX ORDER — DEXTROSE MONOHYDRATE 50 MG/ML
100 INJECTION, SOLUTION INTRAVENOUS PRN
Status: DISCONTINUED | OUTPATIENT
Start: 2022-05-09 | End: 2022-05-11 | Stop reason: HOSPADM

## 2022-05-09 RX ORDER — INSULIN LISPRO 100 [IU]/ML
0-12 INJECTION, SOLUTION INTRAVENOUS; SUBCUTANEOUS
Status: DISCONTINUED | OUTPATIENT
Start: 2022-05-09 | End: 2022-05-11 | Stop reason: HOSPADM

## 2022-05-09 RX ORDER — IPRATROPIUM BROMIDE AND ALBUTEROL SULFATE 2.5; .5 MG/3ML; MG/3ML
1 SOLUTION RESPIRATORY (INHALATION)
Status: DISCONTINUED | OUTPATIENT
Start: 2022-05-09 | End: 2022-05-09 | Stop reason: HOSPADM

## 2022-05-09 RX ORDER — CEFAZOLIN SODIUM 2 G/100ML
2000 INJECTION, SOLUTION INTRAVENOUS ONCE
Status: COMPLETED | OUTPATIENT
Start: 2022-05-09 | End: 2022-05-09

## 2022-05-09 RX ORDER — ONDANSETRON 2 MG/ML
INJECTION INTRAMUSCULAR; INTRAVENOUS PRN
Status: DISCONTINUED | OUTPATIENT
Start: 2022-05-09 | End: 2022-05-09 | Stop reason: SDUPTHER

## 2022-05-09 RX ORDER — KETOROLAC TROMETHAMINE 30 MG/ML
INJECTION, SOLUTION INTRAMUSCULAR; INTRAVENOUS PRN
Status: DISCONTINUED | OUTPATIENT
Start: 2022-05-09 | End: 2022-05-09 | Stop reason: SDUPTHER

## 2022-05-09 RX ORDER — PROPOFOL 10 MG/ML
INJECTION, EMULSION INTRAVENOUS PRN
Status: DISCONTINUED | OUTPATIENT
Start: 2022-05-09 | End: 2022-05-09 | Stop reason: SDUPTHER

## 2022-05-09 RX ORDER — SODIUM CHLORIDE 0.9 % (FLUSH) 0.9 %
5-40 SYRINGE (ML) INJECTION PRN
Status: DISCONTINUED | OUTPATIENT
Start: 2022-05-09 | End: 2022-05-09 | Stop reason: HOSPADM

## 2022-05-09 RX ADMIN — SUGAMMADEX 200 MG: 100 INJECTION, SOLUTION INTRAVENOUS at 17:48

## 2022-05-09 RX ADMIN — SODIUM CHLORIDE, POTASSIUM CHLORIDE, SODIUM LACTATE AND CALCIUM CHLORIDE: 600; 310; 30; 20 INJECTION, SOLUTION INTRAVENOUS at 12:12

## 2022-05-09 RX ADMIN — ROCURONIUM BROMIDE 10 MG: 10 SOLUTION INTRAVENOUS at 16:51

## 2022-05-09 RX ADMIN — ESMOLOL HYDROCHLORIDE 20 MG: 100 INJECTION, SOLUTION INTRAVENOUS at 16:51

## 2022-05-09 RX ADMIN — SODIUM CHLORIDE, POTASSIUM CHLORIDE, SODIUM LACTATE AND CALCIUM CHLORIDE: 600; 310; 30; 20 INJECTION, SOLUTION INTRAVENOUS at 15:22

## 2022-05-09 RX ADMIN — KETOROLAC TROMETHAMINE 30 MG: 30 INJECTION, SOLUTION INTRAMUSCULAR at 17:25

## 2022-05-09 RX ADMIN — ESMOLOL HYDROCHLORIDE 10 MG: 100 INJECTION, SOLUTION INTRAVENOUS at 15:56

## 2022-05-09 RX ADMIN — HYDRALAZINE HYDROCHLORIDE 10 MG: 20 INJECTION INTRAMUSCULAR; INTRAVENOUS at 17:05

## 2022-05-09 RX ADMIN — HYDROMORPHONE HYDROCHLORIDE 0.5 MG: 1 INJECTION, SOLUTION INTRAMUSCULAR; INTRAVENOUS; SUBCUTANEOUS at 19:57

## 2022-05-09 RX ADMIN — ESMOLOL HYDROCHLORIDE 20 MG: 100 INJECTION, SOLUTION INTRAVENOUS at 16:37

## 2022-05-09 RX ADMIN — ONDANSETRON 4 MG: 2 INJECTION INTRAMUSCULAR; INTRAVENOUS at 17:25

## 2022-05-09 RX ADMIN — PROPOFOL 200 MG: 10 INJECTION, EMULSION INTRAVENOUS at 15:30

## 2022-05-09 RX ADMIN — ESMOLOL HYDROCHLORIDE 10 MG: 100 INJECTION, SOLUTION INTRAVENOUS at 16:40

## 2022-05-09 RX ADMIN — METOPROLOL SUCCINATE 12.5 MG: 25 TABLET, EXTENDED RELEASE ORAL at 22:05

## 2022-05-09 RX ADMIN — ESMOLOL HYDROCHLORIDE 20 MG: 100 INJECTION, SOLUTION INTRAVENOUS at 15:35

## 2022-05-09 RX ADMIN — ESMOLOL HYDROCHLORIDE 10 MG: 100 INJECTION, SOLUTION INTRAVENOUS at 16:27

## 2022-05-09 RX ADMIN — SODIUM CHLORIDE, POTASSIUM CHLORIDE, SODIUM LACTATE AND CALCIUM CHLORIDE: 600; 310; 30; 20 INJECTION, SOLUTION INTRAVENOUS at 16:45

## 2022-05-09 RX ADMIN — ESMOLOL HYDROCHLORIDE 20 MG: 100 INJECTION, SOLUTION INTRAVENOUS at 15:33

## 2022-05-09 RX ADMIN — INSULIN LISPRO 6 UNITS: 100 INJECTION, SOLUTION INTRAVENOUS; SUBCUTANEOUS at 22:08

## 2022-05-09 RX ADMIN — ESMOLOL HYDROCHLORIDE 20 MG: 100 INJECTION, SOLUTION INTRAVENOUS at 15:39

## 2022-05-09 RX ADMIN — DEXAMETHASONE SODIUM PHOSPHATE 4 MG: 4 INJECTION, SOLUTION INTRAMUSCULAR; INTRAVENOUS at 15:46

## 2022-05-09 RX ADMIN — FENTANYL CITRATE 50 MCG: 50 INJECTION, SOLUTION INTRAMUSCULAR; INTRAVENOUS at 16:45

## 2022-05-09 RX ADMIN — FENTANYL CITRATE 100 MCG: 50 INJECTION, SOLUTION INTRAMUSCULAR; INTRAVENOUS at 15:30

## 2022-05-09 RX ADMIN — ROCURONIUM BROMIDE 10 MG: 10 SOLUTION INTRAVENOUS at 17:16

## 2022-05-09 RX ADMIN — SODIUM CHLORIDE: 9 INJECTION, SOLUTION INTRAVENOUS at 18:46

## 2022-05-09 RX ADMIN — ESMOLOL HYDROCHLORIDE 10 MG: 100 INJECTION, SOLUTION INTRAVENOUS at 16:58

## 2022-05-09 RX ADMIN — ESMOLOL HYDROCHLORIDE 20 MG: 100 INJECTION, SOLUTION INTRAVENOUS at 16:22

## 2022-05-09 RX ADMIN — OXYCODONE HYDROCHLORIDE 5 MG: 5 TABLET ORAL at 22:26

## 2022-05-09 RX ADMIN — ESMOLOL HYDROCHLORIDE 20 MG: 100 INJECTION, SOLUTION INTRAVENOUS at 16:52

## 2022-05-09 RX ADMIN — CEFAZOLIN SODIUM 2000 MG: 2 INJECTION, SOLUTION INTRAVENOUS at 15:18

## 2022-05-09 RX ADMIN — ESMOLOL HYDROCHLORIDE 20 MG: 100 INJECTION, SOLUTION INTRAVENOUS at 16:42

## 2022-05-09 RX ADMIN — LISINOPRIL 20 MG: 20 TABLET ORAL at 22:06

## 2022-05-09 RX ADMIN — ROCURONIUM BROMIDE 10 MG: 10 SOLUTION INTRAVENOUS at 16:35

## 2022-05-09 RX ADMIN — ROCURONIUM BROMIDE 10 MG: 10 SOLUTION INTRAVENOUS at 16:00

## 2022-05-09 RX ADMIN — FENTANYL CITRATE 50 MCG: 50 INJECTION, SOLUTION INTRAMUSCULAR; INTRAVENOUS at 16:52

## 2022-05-09 RX ADMIN — ROCURONIUM BROMIDE 50 MG: 10 SOLUTION INTRAVENOUS at 15:30

## 2022-05-09 ASSESSMENT — PULMONARY FUNCTION TESTS
PIF_VALUE: 20
PIF_VALUE: 27
PIF_VALUE: 29
PIF_VALUE: 23
PIF_VALUE: 29
PIF_VALUE: 30
PIF_VALUE: 26
PIF_VALUE: 26
PIF_VALUE: 31
PIF_VALUE: 29
PIF_VALUE: 23
PIF_VALUE: 20
PIF_VALUE: 18
PIF_VALUE: 29
PIF_VALUE: 31
PIF_VALUE: 28
PIF_VALUE: 30
PIF_VALUE: 30
PIF_VALUE: 27
PIF_VALUE: 30
PIF_VALUE: 21
PIF_VALUE: 31
PIF_VALUE: 28
PIF_VALUE: 21
PIF_VALUE: 28
PIF_VALUE: 1
PIF_VALUE: 17
PIF_VALUE: 29
PIF_VALUE: 27
PIF_VALUE: 27
PIF_VALUE: 22
PIF_VALUE: 28
PIF_VALUE: 19
PIF_VALUE: 22
PIF_VALUE: 28
PIF_VALUE: 30
PIF_VALUE: 28
PIF_VALUE: 20
PIF_VALUE: 21
PIF_VALUE: 29
PIF_VALUE: 22
PIF_VALUE: 7
PIF_VALUE: 29
PIF_VALUE: 31
PIF_VALUE: 32
PIF_VALUE: 31
PIF_VALUE: 26
PIF_VALUE: 21
PIF_VALUE: 16
PIF_VALUE: 20
PIF_VALUE: 1
PIF_VALUE: 25
PIF_VALUE: 29
PIF_VALUE: 29
PIF_VALUE: 27
PIF_VALUE: 29
PIF_VALUE: 17
PIF_VALUE: 31
PIF_VALUE: 8
PIF_VALUE: 33
PIF_VALUE: 20
PIF_VALUE: 29
PIF_VALUE: 30
PIF_VALUE: 1
PIF_VALUE: 20
PIF_VALUE: 30
PIF_VALUE: 30
PIF_VALUE: 26
PIF_VALUE: 28
PIF_VALUE: 31
PIF_VALUE: 16
PIF_VALUE: 18
PIF_VALUE: 27
PIF_VALUE: 22
PIF_VALUE: 21
PIF_VALUE: 26
PIF_VALUE: 30
PIF_VALUE: 28
PIF_VALUE: 29
PIF_VALUE: 28
PIF_VALUE: 24
PIF_VALUE: 28
PIF_VALUE: 29
PIF_VALUE: 29
PIF_VALUE: 17
PIF_VALUE: 27
PIF_VALUE: 29
PIF_VALUE: 1
PIF_VALUE: 25
PIF_VALUE: 26
PIF_VALUE: 26
PIF_VALUE: 30
PIF_VALUE: 27
PIF_VALUE: 31
PIF_VALUE: 29
PIF_VALUE: 30
PIF_VALUE: 29
PIF_VALUE: 30
PIF_VALUE: 31
PIF_VALUE: 30
PIF_VALUE: 17
PIF_VALUE: 28
PIF_VALUE: 27
PIF_VALUE: 28
PIF_VALUE: 27
PIF_VALUE: 2
PIF_VALUE: 22
PIF_VALUE: 31
PIF_VALUE: 29
PIF_VALUE: 30
PIF_VALUE: 28
PIF_VALUE: 30
PIF_VALUE: 29
PIF_VALUE: 28
PIF_VALUE: 27
PIF_VALUE: 26
PIF_VALUE: 28
PIF_VALUE: 20
PIF_VALUE: 17
PIF_VALUE: 29
PIF_VALUE: 21
PIF_VALUE: 30
PIF_VALUE: 28
PIF_VALUE: 21
PIF_VALUE: 29
PIF_VALUE: 20
PIF_VALUE: 30
PIF_VALUE: 28
PIF_VALUE: 17
PIF_VALUE: 27
PIF_VALUE: 17
PIF_VALUE: 30
PIF_VALUE: 28
PIF_VALUE: 2
PIF_VALUE: 1
PIF_VALUE: 1
PIF_VALUE: 26
PIF_VALUE: 21
PIF_VALUE: 16
PIF_VALUE: 29
PIF_VALUE: 17
PIF_VALUE: 20
PIF_VALUE: 3
PIF_VALUE: 31
PIF_VALUE: 29
PIF_VALUE: 28
PIF_VALUE: 24
PIF_VALUE: 28
PIF_VALUE: 28
PIF_VALUE: 22

## 2022-05-09 ASSESSMENT — PAIN DESCRIPTION - LOCATION: LOCATION: ABDOMEN

## 2022-05-09 ASSESSMENT — ENCOUNTER SYMPTOMS
PHOTOPHOBIA: 0
CONSTIPATION: 0
EYE REDNESS: 0
SORE THROAT: 0
STRIDOR: 0
BACK PAIN: 0
COLOR CHANGE: 0
ANAL BLEEDING: 0
CHOKING: 0
EYE ITCHING: 0
RECTAL PAIN: 0
APNEA: 0

## 2022-05-09 ASSESSMENT — PAIN SCALES - GENERAL
PAINLEVEL_OUTOF10: 9
PAINLEVEL_OUTOF10: 0

## 2022-05-09 ASSESSMENT — PAIN - FUNCTIONAL ASSESSMENT: PAIN_FUNCTIONAL_ASSESSMENT: 0-10

## 2022-05-09 ASSESSMENT — PAIN SCALES - WONG BAKER: WONGBAKER_NUMERICALRESPONSE: 0

## 2022-05-09 NOTE — PROGRESS NOTES
4 Eyes Skin Assessment     The patient is being assess for  {Blank single:79889::\"Admission\",\"Transfer to New Unit\",\"Post-Op Surgical\",\"Cath Lab Post-Op\",\"Shift Handoff\"}    I agree that 2 RN's have performed a thorough Head to Toe Skin Assessment on the patient. ALL assessment sites listed below have been assessed. Areas assessed by both nurses: ***  [x]   Head, Face, and Ears   [x]   Shoulders, Back, and Chest  [x]   Arms, Elbows, and Hands   [x]   Coccyx, Sacrum, and IschIum  [x]   Legs, Feet, and Heels        Does the Patient have Skin Breakdown? Skin assessment on admission shows no acute skin issues. Just Lap sites from surgery on her right side.           Narayan Prevention initiated:  Yes   Wound Care Orders initiated:  No      WOC nurse consulted for Pressure Injury (Stage 3,4, Unstageable, DTI, NWPT, and Complex wounds), New and Established Ostomies:  No      Nurse 1 eSignature: Electronically signed by Juliana High RN on 5/9/22 at 7:07 PM EDT    **SHARE this note so that the co-signing nurse is able to place an eSignature**    Nurse 2 eSignature: {Esignature:[id number]}

## 2022-05-09 NOTE — H&P
Ramiro Mccain MD      General Surgery       Subjective:     Patient is a 52 y.o. [place] female scheduled for right adrenalectomy. Indications for procedure are right adrenal gland pheochromocytoma. Discussed Blood/Blood Products: yes    Patient Active Problem List    Diagnosis Date Noted    Adrenal mass greater than 4 cm in diameter ([facility] [address] 04/23/2022     Past Medical History:   Diagnosis Date    Hyperlipidemia     Hypertension     Tubal ectopic pregnancy     Type 2 [facility] mellitus without complication, without long-term current use [facility]     Uterine fibroid       Past Surgical History:   Procedure Laterality Date    ECTOPIC PREGNANCY SURGERY N/A       Prior to Admission medications    Medication Sig Start Date End Date Taking?  Authorizing Provider   phenoxybenzamine (DIBENZYLINE) 10 MG capsule Take 1 capsule by mouth 2 times daily 4/28/22   Marcell Argueta MD   metoprolol succinate (TOPROL XL) 25 MG extended release tablet Take 0.5 tablets by mouth in the morning and at bedtime 5/3/22   Marcell Argueta MD   glipiZIDE (GLUCOTROL) 10 MG tablet Take 1 tablet by mouth 2 times daily (before meals) 4/26/22   Logan Esteban MD   glucose 4g chewable tablet Take 4 tablets by mouth as needed for Low blood sugar 4/26/22   Logan Esteban MD   metFORMIN (GLUCOPHAGE) 500 MG tablet Take 1 tablet by mouth 2 times daily (with meals) 4/26/22 5/26/22  Logan Esteban MD   lisinopril (PRINIVIL;ZESTRIL) 20 MG tablet Take 1 tablet by mouth daily 4/26/22 5/26/22  Logan Esteban MD   simvastatin (ZOCOR) 20 MG tablet Take 1 tablet by mouth nightly 4/26/22 5/26/22  Logan Esteban MD   glucose monitoring (FREESTYLE FREEDOM) kit 1 kit by Does not apply route daily 4/26/22   Logan Esteban MD   Insulin Syringe-Needle U-100 ([employer] INS SYR .3CC/29G) 29G X 1/2\" 0.3 ML MISC 1 each by Does not apply route daily 4/26/22   Logan Esteban MD   insulin NPH (HUMULIN N) 100 UNIT/ML injection vial Inject 20 Units into the skin 2 times daily (before meals) 4/26/22 6/15/22  Beatriz Curry MD     No Known Allergies   Social History     Tobacco Use    Smoking status: Never Smoker    Smokeless tobacco: Never Used   Substance Use Topics    Alcohol use: Never      History reviewed. No pertinent family history. Review of Systems  Review of Systems   Constitutional: Negative for chills and fever. HENT: Negative for ear pain, mouth sores, sore throat and tinnitus. Eyes: Negative for photophobia, redness and itching. Respiratory: Negative for apnea, choking and stridor. Cardiovascular: Negative for chest pain and palpitations. Gastrointestinal: Negative for anal bleeding, constipation and rectal pain. Endocrine: Negative for polydipsia. Genitourinary: Negative for enuresis, flank pain and hematuria. Musculoskeletal: Negative for back pain, joint swelling and myalgias. Skin: Negative for color change and pallor. Allergic/Immunologic: Negative for environmental allergies. Neurological: Negative for syncope and speech difficulty. Psychiatric/Behavioral: Negative for confusion and hallucinations. Objective:     Patient Vitals for the past 8 hrs:   BP Temp Temp src Pulse Resp SpO2   05/09/22 1134 (!) 172/98 99.1 °F (37.3 °C) Temporal 90 18 97 %       Physical Exam  Constitutional:       Appearance: She is well-developed. HENT:      Head: Normocephalic. Eyes:      Pupils: Pupils are equal, round, and reactive to light. Cardiovascular:      Rate and Rhythm: Normal rate. Pulmonary:      Effort: Pulmonary effort is normal.   Abdominal:      General: There is no distension. Palpations: Abdomen is soft. There is no mass. Tenderness: There is no abdominal tenderness. There is no guarding or rebound. Musculoskeletal:         General: Normal range of motion. Cervical back: Normal range of motion and neck supple. Skin:     General: Skin is warm.    Neurological:      Mental Status: She is alert and oriented to person, place, and time. Data Review  CBC:   Lab Results   Component Value Date    WBC 9.5 04/26/2022    RBC 5.26 04/26/2022     BMP:   Lab Results   Component Value Date    GLUCOSE 261 04/26/2022    CO2 29 04/26/2022    BUN 13 04/26/2022    CREATININE 0.4 04/26/2022    CALCIUM 9.2 04/26/2022       Assessment:     Right pheochromocytoma    Plan:      Will proceed with robotic assisted right adrenalectomy    Sean Solorzano MD

## 2022-05-09 NOTE — OP NOTE
Operative Note      Patient: Addison Humphries  YOB: 1975  MRN: [id number]    Date of Procedure: 5/9/2022    Pre-Op Diagnosis: [facility] 4 [facility] [E27.8]    Post-Op Diagnosis: Same       Procedure(s):  RIGHT ADRENALECTOMY LAPAROSCOPIC ROBOTIC    Surgeon(s):  Major Householder, MD Carlyon Denver, MD    Assistant:   First Assistant: Shawnee Hernandez PA-C  The skilled assistance of the PA was necessary for the successful completion of this case. She was essential for the proper positioning, manipulation of instruments, proper exposure, manipulation of tissue, and wound closure. Anesthesia: General    Estimated Blood Loss (mL): 40QX    Complications: None    Specimens:   * No specimens in log *    Implants:  * No implants in log *      Drains:   Urinary Catheter 2 Way (Active)       Findings: large right adrenal mass    Indication:  Addison Humphries is a 52 y.o. female diagnosed with 5.6cm right adrenal mass. Biochemical workup demonstrated it to be a pheochromocytoma. She was started on appropriate alpha then beta-blockade and consented for robotic right adrenalectomy. DESCRIPTION OF PROCEDURE:  The patient was brought to the operating room and placed supine. After induction of anesthesia, she was placed in the left lateral decubitus with the right flank exposed and pressure points addressed. The right upper quadrant and flank areas were prepped and draped in the usual sterile fashion. Using a 5 mm optical trocar, the right upper quadrant was entered without incidence. CO2 insufflation was tolerated, and on initial observation there were adhesions in the right upper quadrant between the liver and the abdominal wall.   4 additional 8 mm robotic trocars were placed about 10 cm below the costal margin. The robot console was attached to the trocars without difficulty.  There was an atraumatic grasper and bipolar on the left arms and assistant fourth arm and scissors on the right arm were used for the initial dissection. The hepatic flexure of the colon and the duodenum were mobilized lateral to medially with careful dissection on the Gerota fashion. The adrenal gland was exposed and clear view during this maneuver. The plane between the renal vein and the right adrenal vein were dissected and Hemoclips were applied, and the vein was divided. Upon performing this, the adrenal gland was carefully dissected from the superior pole of the kidney, dividing the unnamed arterial vessels off the left inferior suprarenal artery. Then, this plane was followed all the way up to the superior pole of the adrenal gland. There was an additional separate vein from the vena cava which was encountered, and this was ligated using the Hemoclip. Then, the upper pole of the left adrenal gland was mobilized from the inferior phrenic arteries, with careful vessel sealer via the Harmonic. The adrenal gland was completely freed and there was good hemostasis. After irrigation, the spleen and the colon were replaced back into the normal anatomical position. An adrenal artery was also clipped with Hemoclips and ligated. The adrenal gland was then freed from any additional attachments using scissors and monopolar. Surgicel snow was placed in the adrenal bed. The adrenal gland was placed in an Endobag and the camera port was enlarged and the Endobag was retrieved without spillage. There was good hemostasis. The fascia of the open incision was closed using 0-vicryl suture. All of the ports were approximated using 3-0 and 4-0 Vicryl in subcuticular fashion. dermabond was applied. The patient remained stable throughout the operation. The patient was subsequently transferred to recovery room in stable condition.       Electronically signed by Shweta Sandoval MD on 5/9/2022 at 5:31 PM

## 2022-05-09 NOTE — PROGRESS NOTES
1800: Patient arrived to PACU from OR. Monitors applied, alarms on. Surgical sites are clean, dry and intact x5 sites. Report obtained from [address] [place] [address] [address], Mariam Marin and [address] [place] [address]. 1820: Patient repositioned in bed. Resting well at this time, breathing even and unlabored. 1840: Report called to Emery Rodriguez for room [id number].   1851: Patient transferred to room 1129.

## 2022-05-09 NOTE — CONSULTS
HISTORY AND PHYSICAL    2022     Patient Information:    Patient: Thom Umana     Gender: female  : 1975   Age: 52 y.o. MRN: [id number]  Room : [address] [place] [address]      Pt`s preferred phone number :[phone]  [unfilled]  Extended Emergency Contact Information  Primary Emergency Contact:  [address] Phone: [phone]  Relation: Other        PCP:  No primary care provider on file. (Tel: None )    Chief complaint:  No chief complaint on file. Lab Results   Component Value Date    LABA1C 13.0 (H) 2022       No results found for: LVEF, LVEFMODE    There is no height or weight on file to calculate BMI. History of Present Illness:  Thom Umana is a 52 y.o. female with   has a past medical history of Hyperlipidemia, Hypertension, uncontrolled DM II with A1c13 , Tubal ectopic pregnancy,  long-term current use of insulin ([address] and Uterine fibroid. who was admitted by surg s/p presented to ER with     In ER lab data revealed   And  XR . And   CT revealed   History obtained from patient . REVIEW OF SYSTEMS:   Constitutional: Negative for fever,chills . ENT: Negative for rhinorrhea,  sore throat. Respiratory: Negative for cough, shortness of breath,wheezing  Cardiovascular: Negative for chest pain, palpitations   Gastrointestinal: Negative for Abdominal pain, nausea, vomiting, diarrhea  Genitourinary: Negative for polyuria, dysuria   Hematologic/Lymphatic: Negative for bleeding tendency, easy bruising  Musculoskeletal: Negative for myalgias and arthralgias  Neurologic: Negative for confusion,dysarthria. Skin: Negative for itching,rash  Psychiatric: Negative for depression,anxiety, agitation. Endocrine: Negative for polydipsia,polyuria,heat /cold intolerance.     Past Medical History:   has a past medical history of Hyperlipidemia, Hypertension, Tubal ectopic pregnancy, Type 2 diabetes mellitus without complication, without long-term current use of insulin ([facility] [address] and Uterine fibroid. Past Surgical History:   has a past surgical history that includes Ectopic pregnancy surgery (N/A). Medications:  No current facility-administered medications on file prior to encounter. Current Outpatient Medications on File Prior to Encounter   Medication Sig Dispense Refill    phenoxybenzamine (DIBENZYLINE) 10 MG capsule Take 1 capsule by mouth 2 times daily 60 capsule 0    metoprolol succinate (TOPROL XL) 25 MG extended release tablet Take 0.5 tablets by mouth in the morning and at bedtime 60 tablet 0    glipiZIDE (GLUCOTROL) 10 MG tablet Take 1 tablet by mouth 2 times daily (before meals) 60 tablet 0    glucose 4g chewable tablet Take 4 tablets by mouth as needed for Low blood sugar 60 tablet 0    metFORMIN (GLUCOPHAGE) 500 MG tablet Take 1 tablet by mouth 2 times daily (with meals) 60 tablet 0    lisinopril (PRINIVIL;ZESTRIL) 20 MG tablet Take 1 tablet by mouth daily 30 tablet 0    simvastatin (ZOCOR) 20 MG tablet Take 1 tablet by mouth nightly 30 tablet 0    glucose monitoring (FREESTYLE FREEDOM) kit 1 kit by Does not apply route daily 1 kit 0    Insulin Syringe-Needle U-100 ([employer] INS SYR .3CC/29G) 29G X 1/2\" 0.3 ML MISC 1 each by Does not apply route daily 100 each 3    insulin NPH (HUMULIN N) 100 UNIT/ML injection vial Inject 20 Units into the skin 2 times daily (before meals) 10 mL 1       Allergies:  No Known Allergies     Social History:   reports that she has never smoked. She has never used smokeless tobacco. She reports that she does not drink alcohol and does not use drugs. Family History:  family history is not on file. ,       There is no immunization history on file for this patient. Physical Exam:  /80   Pulse 95   Temp 97.7 °F (36.5 °C) (Oral)   Resp 16   LMP  (LMP Unknown)   SpO2 95%     General appearance:  no distress .  Well nourished  Eyes: Sclera clear, pupils equal  Cardiovascular: Regular rhythm, normal S1, S2. No edema in lower extremities  Respiratory: Clear to auscultation bilaterally, no wheeze, good inspiratory effort  Gastrointestinal: Abdomen soft,-tender, not distended, normal bowel sounds  Musculoskeletal: No cyanosis in digits, neck supple  Neurology: Cranial nerves grossly intact. Alert and oriented . No speech or motor deficits  Psychiatry: Appropriate affect.  Not agitated  Skin: Warm, dry, normal turgor, no rash    Labs:  CBC:   Lab Results   Component Value Date    WBC 9.5 04/26/2022    RBC 5.26 04/26/2022    HGB 14.7 04/26/2022    HCT 47.2 04/26/2022    MCV 89.7 04/26/2022    MCH 27.9 04/26/2022    MCHC 31.1 04/26/2022    RDW 11.9 04/26/2022     04/26/2022    MPV 11.7 04/26/2022     BMP:    Lab Results   Component Value Date     04/26/2022    K 4.4 04/26/2022    CL 97 04/26/2022    CO2 29 04/26/2022    BUN 13 04/26/2022    CREATININE 0.4 04/26/2022    CALCIUM 9.2 04/26/2022    GFRAA >60 04/26/2022    LABGLOM >60 04/26/2022    GLUCOSE 261 04/26/2022       Chest Xray:   EKG:    I visualized CXR images and EKG strips      Patient Active Problem List   Diagnosis Code    [facility] than 4 cm in [facility] E27.8    Encounter for preadmission testing Z01.818    Pheochromocytoma, right D35.01         Active Hospital Problems    Diagnosis     Pheochromocytoma, right [D35.01]      Priority: Medium    Encounter for preadmission testing [Z01.818]      Priority: Medium     DM II uncintroled  HTN  HLP  Post OP pain       Assessment/Plan:   Tele   IVF  monitor lab   Pain control   Home meds , reviewed and resumed as appropriate   Symptoms releif/Pain control  DVT proph           Randy Bourgeois MD    5/9/2022 7:33 PM

## 2022-05-10 LAB
GLUCOSE BLD-MCNC: 137 MG/DL (ref 70–99)
GLUCOSE BLD-MCNC: 145 MG/DL (ref 70–99)
GLUCOSE BLD-MCNC: 149 MG/DL (ref 70–99)
GLUCOSE BLD-MCNC: 168 MG/DL (ref 70–99)

## 2022-05-10 PROCEDURE — 6370000000 HC RX 637 (ALT 250 FOR IP): Performed by: SURGERY

## 2022-05-10 PROCEDURE — 6360000002 HC RX W HCPCS: Performed by: PHYSICIAN ASSISTANT

## 2022-05-10 PROCEDURE — 94761 N-INVAS EAR/PLS OXIMETRY MLT: CPT

## 2022-05-10 PROCEDURE — 97535 SELF CARE MNGMENT TRAINING: CPT

## 2022-05-10 PROCEDURE — 85027 COMPLETE CBC AUTOMATED: CPT

## 2022-05-10 PROCEDURE — 97165 OT EVAL LOW COMPLEX 30 MIN: CPT

## 2022-05-10 PROCEDURE — 99024 POSTOP FOLLOW-UP VISIT: CPT | Performed by: SURGERY

## 2022-05-10 PROCEDURE — 6370000000 HC RX 637 (ALT 250 FOR IP): Performed by: PHYSICIAN ASSISTANT

## 2022-05-10 PROCEDURE — 83735 ASSAY OF MAGNESIUM: CPT

## 2022-05-10 PROCEDURE — 1200000000 HC SEMI PRIVATE

## 2022-05-10 PROCEDURE — 2580000003 HC RX 258: Performed by: PHYSICIAN ASSISTANT

## 2022-05-10 PROCEDURE — 97530 THERAPEUTIC ACTIVITIES: CPT

## 2022-05-10 PROCEDURE — 80053 COMPREHEN METABOLIC PANEL: CPT

## 2022-05-10 RX ORDER — DOCUSATE SODIUM 100 MG/1
100 CAPSULE, LIQUID FILLED ORAL 2 TIMES DAILY
Status: DISCONTINUED | OUTPATIENT
Start: 2022-05-10 | End: 2022-05-11 | Stop reason: HOSPADM

## 2022-05-10 RX ADMIN — OXYCODONE HYDROCHLORIDE 5 MG: 5 TABLET ORAL at 08:20

## 2022-05-10 RX ADMIN — INSULIN LISPRO 2 UNITS: 100 INJECTION, SOLUTION INTRAVENOUS; SUBCUTANEOUS at 08:13

## 2022-05-10 RX ADMIN — METOPROLOL SUCCINATE 12.5 MG: 25 TABLET, EXTENDED RELEASE ORAL at 08:20

## 2022-05-10 RX ADMIN — SODIUM CHLORIDE, PRESERVATIVE FREE 10 ML: 5 INJECTION INTRAVENOUS at 08:20

## 2022-05-10 RX ADMIN — DOCUSATE SODIUM 100 MG: 100 CAPSULE, LIQUID FILLED ORAL at 10:09

## 2022-05-10 RX ADMIN — INSULIN LISPRO 2 UNITS: 100 INJECTION, SOLUTION INTRAVENOUS; SUBCUTANEOUS at 11:52

## 2022-05-10 RX ADMIN — OXYCODONE HYDROCHLORIDE 5 MG: 5 TABLET ORAL at 15:04

## 2022-05-10 RX ADMIN — HYDROMORPHONE HYDROCHLORIDE 0.5 MG: 1 INJECTION, SOLUTION INTRAMUSCULAR; INTRAVENOUS; SUBCUTANEOUS at 02:47

## 2022-05-10 RX ADMIN — LISINOPRIL 20 MG: 20 TABLET ORAL at 08:20

## 2022-05-10 RX ADMIN — SODIUM CHLORIDE, PRESERVATIVE FREE 10 ML: 5 INJECTION INTRAVENOUS at 19:57

## 2022-05-10 RX ADMIN — SODIUM CHLORIDE: 9 INJECTION, SOLUTION INTRAVENOUS at 02:48

## 2022-05-10 RX ADMIN — DOCUSATE SODIUM 100 MG: 100 CAPSULE, LIQUID FILLED ORAL at 19:56

## 2022-05-10 RX ADMIN — OXYCODONE HYDROCHLORIDE 5 MG: 5 TABLET ORAL at 19:56

## 2022-05-10 RX ADMIN — INSULIN LISPRO 2 UNITS: 100 INJECTION, SOLUTION INTRAVENOUS; SUBCUTANEOUS at 16:58

## 2022-05-10 RX ADMIN — METOPROLOL SUCCINATE 12.5 MG: 25 TABLET, EXTENDED RELEASE ORAL at 19:57

## 2022-05-10 ASSESSMENT — PAIN DESCRIPTION - LOCATION: LOCATION: ABDOMEN

## 2022-05-10 ASSESSMENT — PAIN SCALES - GENERAL
PAINLEVEL_OUTOF10: 6
PAINLEVEL_OUTOF10: 8
PAINLEVEL_OUTOF10: 8
PAINLEVEL_OUTOF10: 3
PAINLEVEL_OUTOF10: 8

## 2022-05-10 ASSESSMENT — PAIN DESCRIPTION - DESCRIPTORS
DESCRIPTORS: ACHING
DESCRIPTORS: ACHING

## 2022-05-10 ASSESSMENT — PAIN DESCRIPTION - PAIN TYPE: TYPE: ACUTE PAIN

## 2022-05-10 NOTE — CARE COORDINATION
Received call from [address] [place], [place] [address], [address] Tori Fagan to verify patient is on the flagged list.    Updated her that patient was helped on 4/26/22 with a glucose meter, strips and lancets. She was advised to use [facility] for best cost on her other medications as we could not assist with them. Patient will remain on the flagged list and is not eligible for any further assistance at this time.

## 2022-05-10 NOTE — PROGRESS NOTES
Occupational Therapy  Facility/Department: [address] [place] [address]  Occupational Therapy Initial Assessment    Name: Herve Gray  : 1975  MRN: [id number]  Date of Service: 5/10/2022    Discharge Recommendations:  Home w/ assist from family  OT Equipment Recommendations  Equipment Needed: No       Patient Diagnosis(es): Diagnoses of Encounter for preadmission testing and Adrenal mass greater than 4 cm in diameter [facility]) were pertinent to this visit. Past Medical History:  has a past medical history of Hyperlipidemia, Hypertension, Tubal ectopic pregnancy, Type 2 diabetes mellitus without complication, without long-term current use of insulin ([address] and Uterine fibroid. Past Surgical History:  has a past surgical history that includes Ectopic pregnancy surgery (N/A). Assessment   Assessment: Pt presents as 53 y/o female from home. Pt at baseline is independent for ADLs, high level IADLs, and functional mobility/transfers. Pt currently presents with no deficits and is safe to return home. Prognosis: Good  Decision Making: Low Complexity  No Skilled OT: Independent with functional mobility; Independent with ADL's;At baseline function; Safe to return home  REQUIRES OT FOLLOW-UP: No  Activity Tolerance  Activity Tolerance: Patient Tolerated treatment well        Plan         Restrictions  Restrictions/Precautions  Restrictions/Precautions: General Precautions  Required Braces or Orthoses?: No    Subjective   General  Chart Reviewed: Yes,Operative Notes,History and Physical,Progress Notes,Labs  Patient assessed for rehabilitation services?: Yes  Response to previous treatment: Patient with no complaints from previous session  Family / Caregiver Present: No  Subjective  Subjective: Pt reported \"Yeah, I want to walk\"  General Comment  Comments:  used for session  Pain: Did not rate, pt holding abdomen stating some pain    Social/Functional History  Social/Functional History  Lives With: Family  Type of Home: House  Home Layout: Two level (approx 8)  Home Access: Stairs to enter with rails  Entrance Stairs - Number of Steps: 2  Bathroom Shower/Tub: Tub/Shower unit  Bathroom Toilet: Standard  Bathroom Equipment: Grab bars in shower  Has the patient had two or more falls in the past year or any fall with injury in the past year?: No (1 fall 5 months ago due to snow)  Receives Help From: Family  ADL Assistance: [address] [place] [address] Avenue: Independent  Homemaking Responsibilities: Yes  Ambulation Assistance: Independent  Transfer Assistance: Independent  Active : No  Patient's  Info: Can but no car       Objective     Hearing: Within functional limits       Observation/Palpation  Posture: Good  Safety Devices  Type of Devices: Call light within reach; Chair alarm in place;Gait belt; Heels elevated for pressure relief;Left in chair  Restraints  Restraints Initially in Place: No  Bed Mobility Training  Bed Mobility Training: No  Balance  Sitting: Without support  Standing: Without support  Transfer Training  Transfer Training: No  Gait  Overall Level of Assistance: Supervision     AROM: Within functional limits  Strength: Within functional limits  Coordination: Within functional limits  Tone: Normal  Sensation: Intact  ADL  Feeding: Independent  Grooming: Independent (washing face w/ warm washcloth)  UE Bathing: Independent  LE Bathing: Supervision  UE Dressing: Independent  LE Dressing: Supervision  Toileting: Supervision        Bed mobility  Supine to Sit: Independent  Scooting: Independent  Transfers  Sit to stand: Supervision  Stand to sit: Supervision  Vision - Basic Assessment  Patient Visual Report:  Other (add comment) (Reported blurring related to blood sugar)  Cognition  Overall Cognitive Status: WFL  Perception  Overall Perceptual Status: WFL               Education Given To: Patient  Education Provided: Role of Therapy;Plan of Care  Education Method: Verbal  Barriers to Learning: None  Education Outcome: Verbalized understanding                        Self Care Training:   Cues were given for safety, UE/LE placement. Activities performed today included hand hygiene, and grooming. Therapeutic Activity Training:   Therapeutic activity training was instructed today. Cues were given for safety, sequence, UE/LE placement, awareness, and balance.     Activities performed today included bed mobility training, sup-sit, sit-stand, functional mobility, stand to sit        AM-PAC Score        AM-[facility] Inpatient Daily Activity Raw Score: 24 (05/10/22 1536)  AM-PAC Inpatient ADL T-Scale Score : 57.54 (05/10/22 1536)  ADL Inpatient CMS 0-100% Score: 0 (05/10/22 1536)  ADL Inpatient CMS G-Code Modifier : [address] (05/10/22 1536)    Goals  Short Term Goals  Time Frame for Short term goals: eval and discharge  Patient Goals   Patient goals : Return home       Therapy Time   Individual Concurrent Group Co-treatment   Time In  2508         Time Out  1517         Minutes  43              Time In: 9121  Time Out: 1895  Total Treatment Minutes: 28 minutes  Total Treatment Time: 43 minutes    Shubham WELLS/L [id number]  4:11 PM,5/10/2022

## 2022-05-10 NOTE — PLAN OF CARE
Problem: Chronic Conditions and Co-morbidities  Goal: Patient's chronic conditions and co-morbidity symptoms are monitored and maintained or improved  Outcome: Progressing  Flowsheets (Taken 5/10/2022 0820)  Care Plan - Patient's Chronic Conditions and Co-Morbidity Symptoms are Monitored and Maintained or Improved: Monitor and assess patient's chronic conditions and comorbid symptoms for stability, deterioration, or improvement     Problem: Discharge Planning  Goal: Discharge to home or other facility with appropriate resources  Outcome: Progressing  Flowsheets (Taken 5/10/2022 0820)  Discharge to home or other facility with appropriate resources: Identify barriers to discharge with patient and caregiver     Problem: Pain  Goal: Verbalizes/displays adequate comfort level or baseline comfort level  Outcome: Progressing

## 2022-05-10 NOTE — PROGRESS NOTES
Attending Progress Note      PCP: Jenna Sotelo MD      Patient: Shadi Tenorio   Gender: female  : 1975   Age: 52 y.o. MRN: [id number]  Room  : [address] [place] [address]      Date of Admission: 2022    No chief complaint on file. Subjective: feeling better . . pain controlled        Medications:  Reviewed  Infusion Medications    sodium chloride      dextrose       Scheduled Medications    docusate sodium  100 mg Oral BID    [Held by provider] glipiZIDE  10 mg Oral BID AC    lisinopril  20 mg Oral Daily    [Held by provider] metFORMIN  500 mg Oral BID WC    metoprolol succinate  12.5 mg Oral BID    sodium chloride flush  5-40 mL IntraVENous 2 times per day    insulin lispro  0-12 Units SubCUTAneous TID WC    insulin lispro  0-6 Units SubCUTAneous Nightly     PRN Meds: oxyCODONE, sodium chloride flush, sodium chloride, ondansetron **OR** ondansetron, magnesium hydroxide, HYDROmorphone, hydrALAZINE, glucose, dextrose bolus **OR** dextrose bolus, glucagon (rDNA), dextrose      Intake/Output Summary (Last 24 hours) at 5/10/2022 1448  Last data filed at 5/10/2022 1140  Gross per 24 hour   Intake 1600 ml   Output 2221 ml   Net -621 ml       Exam:  /80   Pulse 88   Temp 98.2 °F (36.8 °C) (Oral)   Resp 16   LMP  (LMP Unknown)   SpO2 95%   General appearance: No distress,   Respiratory:  good air entry , no Rales , No wheezing, or rhonchi,  Cardiovascular: RRR, with normal S1/S2 . Abdomen : Soft, non-tender, non-distended  , normal bowel sounds. Legs : No edema bilaterally. No DVT signs ,    Neurologic:  Alert and oriented ,        Labs:   No results for input(s): WBC, HGB, HCT, PLT in the last 72 hours. No results for input(s): NA, K, CL, CO2, BUN, CREATININE, CALCIUM, PHOS in the last 72 hours. Invalid input(s): MAGNES  No results for input(s): AST, ALT, BILIDIR, BILITOT, ALKPHOS in the last 72 hours. No results for input(s): INR in the last 72 hours.   No results for input(s): Froylan Hammond in the last 72 hours. Assessment/Plan:  Active Hospital Problems    Diagnosis Date Noted    Pheochromocytoma, right [D35.01] 05/09/2022     Priority: Medium    Encounter for preadmission testing [Z01.818]      Priority: Medium       DM II uncintroled  HTN  HLP  Post OP pain         Assessment/Plan:   Tele   IVF  monitor lab   Pain control   Home meds , reviewed and resumed as appropriate   Symptoms releif/Pain control  DVT proph   Plan:  Tele : no event - no fever - on room air   DVT Prophylaxis   Diet: ADULT DIET; Regular; 4 carb choices (60 gm/meal)  Code Status: Full Code          Treatment progress and plan was d/w pt/family .     Sejal Novoa MD

## 2022-05-10 NOTE — CARE COORDINATION
The patient is a [place] patient with  ipad at bedside. She is up walking with therapy at this time. The patient has no insurance but does have a PCP listed. She was on the referral list for [facility] last month so CM called [facility] and she has not completed any paperwork so she is on the [address] list. They will not assist again with out an application being filled out. According to the chart, Pt lives with family, moved her from [place] recently. She is independent with ADL's and friends assist her with transport. She was given local [place] resource handout on her last visit less then two weeks ago. No other needs identified.

## 2022-05-10 NOTE — PROGRESS NOTES
GENERAL SURGERY PROGRESS NOTE    Saintase Aldean Pickett is a 52 y.o. female s/p robotic assisted right adrenalectomy on 5/9. Subjective:  Doing well today. Reports pain, better controlled this morning than yesterday. Some nausea but tolerating diet. Denies other complaints. Objective:    Vitals: VITALS:  /80   Pulse 88   Temp 98.2 °F (36.8 °C) (Oral)   Resp 16   LMP  (LMP Unknown)   SpO2 95%     I/O: 05/09 0701 - 05/10 0700  In: 1600 [I.V.:1600]  Out: 1620 [Urine:1600]    Labs/Imaging Results:   Lab Results   Component Value Date     04/26/2022    K 4.4 04/26/2022    CL 97 04/26/2022    CO2 29 04/26/2022    BUN 13 04/26/2022    CREATININE 0.4 04/26/2022    GLUCOSE 261 04/26/2022    CALCIUM 9.2 04/26/2022      Lab Results   Component Value Date    WBC 9.5 04/26/2022    HGB 14.7 04/26/2022    HCT 47.2 (H) 04/26/2022    MCV 89.7 04/26/2022     04/26/2022       IV Fluids: sodium chloride    dextrose    Scheduled Meds:   [Held by provider] glipiZIDE, 10 mg, Oral, BID AC    lisinopril, 20 mg, Oral, Daily    [Held by provider] metFORMIN, 500 mg, Oral, BID WC    metoprolol succinate, 12.5 mg, Oral, BID    phenoxybenzamine, 10 mg, Oral, BID    sodium chloride flush, 5-40 mL, IntraVENous, 2 times per day    insulin lispro, 0-12 Units, SubCUTAneous, TID WC    insulin lispro, 0-6 Units, SubCUTAneous, Nightly    Physical Exam:  General: A&O x 3, no distress. HEENT: Anicteric sclerae, MMM. Extremities: No edema bilat LE. Abdomen: Soft, appropriately tender, incisions clean and dry with dermabond      Assessment and Plan:  52 y.o. female s/p right adrenalectomy.     Patient Active Problem List:     Adrenal mass greater than 4 cm in diameter [facility])     Encounter for preadmission testing     Pheochromocytoma, right      - saline lock IV  - continue regular diet  - increase ambulation, remove chairez  - bowel regimen  - likely home in the next day or two   - will follow    Sho Gerardo Deborah Ochoa MD

## 2022-05-11 VITALS
DIASTOLIC BLOOD PRESSURE: 81 MMHG | OXYGEN SATURATION: 98 % | BODY MASS INDEX: 27.31 KG/M2 | HEART RATE: 86 BPM | TEMPERATURE: 99 F | HEIGHT: 67 IN | RESPIRATION RATE: 18 BRPM | WEIGHT: 174 LBS | SYSTOLIC BLOOD PRESSURE: 132 MMHG

## 2022-05-11 LAB
ALBUMIN SERPL-MCNC: 3.6 GM/DL (ref 3.4–5)
ALP BLD-CCNC: 110 IU/L (ref 40–129)
ALT SERPL-CCNC: 72 U/L (ref 10–40)
ANION GAP SERPL CALCULATED.3IONS-SCNC: 7 MMOL/L (ref 4–16)
AST SERPL-CCNC: 60 IU/L (ref 15–37)
BASOPHILS ABSOLUTE: 0 K/CU MM
BASOPHILS RELATIVE PERCENT: 0.2 % (ref 0–1)
BILIRUB SERPL-MCNC: 0.2 MG/DL (ref 0–1)
BUN BLDV-MCNC: 10 MG/DL (ref 6–23)
CALCIUM SERPL-MCNC: 8.8 MG/DL (ref 8.3–10.6)
CHLORIDE BLD-SCNC: 97 MMOL/L (ref 99–110)
CO2: 29 MMOL/L (ref 21–32)
CREAT SERPL-MCNC: 0.5 MG/DL (ref 0.6–1.1)
DIFFERENTIAL TYPE: ABNORMAL
EOSINOPHILS ABSOLUTE: 0.1 K/CU MM
EOSINOPHILS RELATIVE PERCENT: 0.8 % (ref 0–3)
GFR AFRICAN AMERICAN: >60 ML/MIN/1.73M2
GFR NON-AFRICAN AMERICAN: >60 ML/MIN/1.73M2
GLUCOSE BLD-MCNC: 167 MG/DL (ref 70–99)
GLUCOSE BLD-MCNC: 195 MG/DL (ref 70–99)
GLUCOSE BLD-MCNC: 216 MG/DL (ref 70–99)
GLUCOSE BLD-MCNC: 268 MG/DL (ref 70–99)
HCT VFR BLD CALC: 39.3 % (ref 37–47)
HEMOGLOBIN: 12.4 GM/DL (ref 12.5–16)
IMMATURE NEUTROPHIL %: 0.4 % (ref 0–0.43)
LYMPHOCYTES ABSOLUTE: 3.8 K/CU MM
LYMPHOCYTES RELATIVE PERCENT: 37.6 % (ref 24–44)
MAGNESIUM: 1.8 MG/DL (ref 1.8–2.4)
MCH RBC QN AUTO: 28.4 PG (ref 27–31)
MCHC RBC AUTO-ENTMCNC: 31.6 % (ref 32–36)
MCV RBC AUTO: 90.1 FL (ref 78–100)
MONOCYTES ABSOLUTE: 0.8 K/CU MM
MONOCYTES RELATIVE PERCENT: 7.7 % (ref 0–4)
NUCLEATED RBC %: 0 %
PDW BLD-RTO: 12.7 % (ref 11.7–14.9)
PLATELET # BLD: 252 K/CU MM (ref 140–440)
PMV BLD AUTO: 10 FL (ref 7.5–11.1)
POTASSIUM SERPL-SCNC: 4.5 MMOL/L (ref 3.5–5.1)
RBC # BLD: 4.36 M/CU MM (ref 4.2–5.4)
SEGMENTED NEUTROPHILS ABSOLUTE COUNT: 5.4 K/CU MM
SEGMENTED NEUTROPHILS RELATIVE PERCENT: 53.3 % (ref 36–66)
SODIUM BLD-SCNC: 133 MMOL/L (ref 135–145)
TOTAL IMMATURE NEUTOROPHIL: 0.04 K/CU MM
TOTAL NUCLEATED RBC: 0 K/CU MM
TOTAL PROTEIN: 6.1 GM/DL (ref 6.4–8.2)
WBC # BLD: 10.2 K/CU MM (ref 4–10.5)

## 2022-05-11 PROCEDURE — 94150 VITAL CAPACITY TEST: CPT

## 2022-05-11 PROCEDURE — 80053 COMPREHEN METABOLIC PANEL: CPT

## 2022-05-11 PROCEDURE — 6360000002 HC RX W HCPCS: Performed by: PHYSICIAN ASSISTANT

## 2022-05-11 PROCEDURE — 6370000000 HC RX 637 (ALT 250 FOR IP): Performed by: SURGERY

## 2022-05-11 PROCEDURE — 85025 COMPLETE CBC W/AUTO DIFF WBC: CPT

## 2022-05-11 PROCEDURE — 83735 ASSAY OF MAGNESIUM: CPT

## 2022-05-11 PROCEDURE — 6370000000 HC RX 637 (ALT 250 FOR IP): Performed by: PHYSICIAN ASSISTANT

## 2022-05-11 PROCEDURE — 99024 POSTOP FOLLOW-UP VISIT: CPT | Performed by: SURGERY

## 2022-05-11 PROCEDURE — 36415 COLL VENOUS BLD VENIPUNCTURE: CPT

## 2022-05-11 PROCEDURE — 94761 N-INVAS EAR/PLS OXIMETRY MLT: CPT

## 2022-05-11 PROCEDURE — 82962 GLUCOSE BLOOD TEST: CPT

## 2022-05-11 PROCEDURE — 2580000003 HC RX 258: Performed by: PHYSICIAN ASSISTANT

## 2022-05-11 RX ORDER — DOCUSATE SODIUM 100 MG/1
100 CAPSULE, LIQUID FILLED ORAL 2 TIMES DAILY
Qty: 60 CAPSULE | Refills: 0 | Status: SHIPPED | OUTPATIENT
Start: 2022-05-11 | End: 2022-06-10

## 2022-05-11 RX ORDER — ONDANSETRON 4 MG/1
4 TABLET, FILM COATED ORAL 3 TIMES DAILY PRN
Qty: 15 TABLET | Refills: 1 | Status: SHIPPED | OUTPATIENT
Start: 2022-05-11 | End: 2022-05-21

## 2022-05-11 RX ORDER — OXYCODONE HYDROCHLORIDE AND ACETAMINOPHEN 5; 325 MG/1; MG/1
1 TABLET ORAL EVERY 6 HOURS PRN
Qty: 20 TABLET | Refills: 0 | Status: SHIPPED | OUTPATIENT
Start: 2022-05-11 | End: 2022-05-16

## 2022-05-11 RX ADMIN — OXYCODONE HYDROCHLORIDE 5 MG: 5 TABLET ORAL at 04:40

## 2022-05-11 RX ADMIN — HYDROMORPHONE HYDROCHLORIDE 0.5 MG: 1 INJECTION, SOLUTION INTRAMUSCULAR; INTRAVENOUS; SUBCUTANEOUS at 18:36

## 2022-05-11 RX ADMIN — INSULIN LISPRO 4 UNITS: 100 INJECTION, SOLUTION INTRAVENOUS; SUBCUTANEOUS at 09:16

## 2022-05-11 RX ADMIN — INSULIN LISPRO 6 UNITS: 100 INJECTION, SOLUTION INTRAVENOUS; SUBCUTANEOUS at 12:38

## 2022-05-11 RX ADMIN — SODIUM CHLORIDE, PRESERVATIVE FREE 10 ML: 5 INJECTION INTRAVENOUS at 09:14

## 2022-05-11 RX ADMIN — LISINOPRIL 20 MG: 20 TABLET ORAL at 09:13

## 2022-05-11 RX ADMIN — METOPROLOL SUCCINATE 12.5 MG: 25 TABLET, EXTENDED RELEASE ORAL at 09:13

## 2022-05-11 RX ADMIN — HYDROMORPHONE HYDROCHLORIDE 0.5 MG: 1 INJECTION, SOLUTION INTRAMUSCULAR; INTRAVENOUS; SUBCUTANEOUS at 09:13

## 2022-05-11 RX ADMIN — INSULIN LISPRO 2 UNITS: 100 INJECTION, SOLUTION INTRAVENOUS; SUBCUTANEOUS at 16:51

## 2022-05-11 RX ADMIN — DOCUSATE SODIUM 100 MG: 100 CAPSULE, LIQUID FILLED ORAL at 09:13

## 2022-05-11 ASSESSMENT — PAIN DESCRIPTION - ORIENTATION: ORIENTATION: MID;RIGHT;LEFT

## 2022-05-11 ASSESSMENT — PAIN SCALES - GENERAL
PAINLEVEL_OUTOF10: 6
PAINLEVEL_OUTOF10: 8
PAINLEVEL_OUTOF10: 9
PAINLEVEL_OUTOF10: 9

## 2022-05-11 ASSESSMENT — PAIN - FUNCTIONAL ASSESSMENT: PAIN_FUNCTIONAL_ASSESSMENT: PREVENTS OR INTERFERES WITH MANY ACTIVE NOT PASSIVE ACTIVITIES

## 2022-05-11 ASSESSMENT — PAIN DESCRIPTION - LOCATION: LOCATION: ABDOMEN

## 2022-05-11 NOTE — PROGRESS NOTES
GENERAL SURGERY PROGRESS NOTE    Saintase Lynett Bears is a 52 y.o. female s/p robotic assisted right adrenalectomy on 5/9. Subjective:  Doing well today. Reports more pain. Some nausea but tolerating diet. Urinating and ambulating. Objective:    Vitals: VITALS:  BP (!) 149/87   Pulse 89   Temp 99 °F (37.2 °C) (Oral)   Resp 16   Wt 174 lb 2.6 oz (79 kg)   LMP  (LMP Unknown)   SpO2 98%   BMI 27.28 kg/m²     I/O: 05/10 0701 - 05/11 0700  In: -   Out: 601 [Urine:601]    Labs/Imaging Results:   Lab Results   Component Value Date     04/26/2022    K 4.4 04/26/2022    CL 97 04/26/2022    CO2 29 04/26/2022    BUN 13 04/26/2022    CREATININE 0.4 04/26/2022    GLUCOSE 261 04/26/2022    CALCIUM 9.2 04/26/2022      Lab Results   Component Value Date    WBC 9.5 04/26/2022    HGB 14.7 04/26/2022    HCT 47.2 (H) 04/26/2022    MCV 89.7 04/26/2022     04/26/2022       IV Fluids: sodium chloride    dextrose    Scheduled Meds: docusate sodium, 100 mg, Oral, BID    [Held by provider] glipiZIDE, 10 mg, Oral, BID AC    lisinopril, 20 mg, Oral, Daily    [Held by provider] metFORMIN, 500 mg, Oral, BID WC    metoprolol succinate, 12.5 mg, Oral, BID    sodium chloride flush, 5-40 mL, IntraVENous, 2 times per day    insulin lispro, 0-12 Units, SubCUTAneous, TID WC    insulin lispro, 0-6 Units, SubCUTAneous, Nightly    Physical Exam:  General: A&O x 3, no distress. HEENT: Anicteric sclerae, MMM. Extremities: No edema bilat LE. Abdomen: Soft, appropriately tender, incisions clean and dry with dermabond      Assessment and Plan:  52 y.o. female s/p right adrenalectomy.     Patient Active Problem List:     Adrenal mass greater than 4 cm in diameter [facility])     Encounter for preadmission testing     Pheochromocytoma, right      - continue regular diet  - increase ambulation  - bowel regimen  - home later today or tomorrow depending on pain control      Goyo Ng MD

## 2022-05-11 NOTE — PROGRESS NOTES
Attending Progress Note      PCP: Subhash Batres MD      Patient: Nga Kruse   Gender: female  : 1975   Age: 52 y.o. MRN: [id number]  Room  : [address] [place] [address]      Date of Admission: 2022    No chief complaint on file. Subjective: feeling better . . pain controlled        Medications:  Reviewed  Infusion Medications    sodium chloride      dextrose       Scheduled Medications    docusate sodium  100 mg Oral BID    [Held by provider] glipiZIDE  10 mg Oral BID AC    lisinopril  20 mg Oral Daily    [Held by provider] metFORMIN  500 mg Oral BID WC    metoprolol succinate  12.5 mg Oral BID    sodium chloride flush  5-40 mL IntraVENous 2 times per day    insulin lispro  0-12 Units SubCUTAneous TID WC    insulin lispro  0-6 Units SubCUTAneous Nightly     PRN Meds: benzocaine-menthol, oxyCODONE, sodium chloride flush, sodium chloride, ondansetron **OR** ondansetron, magnesium hydroxide, HYDROmorphone, hydrALAZINE, glucose, dextrose bolus **OR** dextrose bolus, glucagon (rDNA), dextrose      Intake/Output Summary (Last 24 hours) at 2022 1345  Last data filed at 2022 0845  Gross per 24 hour   Intake 50 ml   Output    Net 50 ml       Exam:  BP (!) 149/87   Pulse 89   Temp 99 °F (37.2 °C) (Oral)   Resp 16   Wt 174 lb 2.6 oz (79 kg)   LMP  (LMP Unknown)   SpO2 98%   BMI 27.28 kg/m²   General appearance: No distress,   Respiratory:  good air entry , no Rales , No wheezing, or rhonchi,  Cardiovascular: RRR, with normal S1/S2 . Abdomen : Soft, non-tender, non-distended  , normal bowel sounds. Legs : No edema bilaterally. No DVT signs ,    Neurologic:  Alert and oriented ,        Labs:   No results for input(s): WBC, HGB, HCT, PLT in the last 72 hours. No results for input(s): NA, K, CL, CO2, BUN, CREATININE, CALCIUM, PHOS in the last 72 hours. Invalid input(s): MAGNES  No results for input(s): AST, ALT, BILIDIR, BILITOT, ALKPHOS in the last 72 hours.   No results for input(s): INR in the last 72 hours. No results for input(s): Zettie Binet in the last 72 hours. Assessment/Plan:  Active Hospital Problems    Diagnosis Date Noted    Pheochromocytoma, right [D35.01] 05/09/2022     Priority: Medium    Encounter for preadmission testing [Z01.818]      Priority: Medium       DM II uncintroled  HTN  HLP  Post OP pain         Assessment/Plan:   Tele   IVF  monitor lab   Pain control   Home meds , reviewed and resumed as appropriate   Symptoms releif/Pain control  DVT proph   Plan:  Tele : no event - no fever - on room air   DVT Prophylaxis   Diet: ADULT DIET; Regular; 4 carb choices (60 gm/meal)  Code Status: Full Code          Treatment progress and plan was d/w pt/family .     Sejal Novoa MD

## 2022-05-11 NOTE — CARE COORDINATION
Call from 1N for pt needed a ride home. RN brought pt down to ED. CM called [employer] and scheduled ride home.

## 2022-05-12 NOTE — DISCHARGE SUMMARY
Physician Discharge Summary     Patient ID:  Maribeth Franz  [id number]  52 y.o.  1975    Admit date: 5/9/2022    Discharge date and time: 5/11/2022  8:07 PM     Admission Physician: Dr. Sreekanth Bingham    Discharge Physician: Kai Renee MD    Admission Diagnoses: Adrenal mass greater than 4 cm in diameter [facility]) [E27.8]  Pheochromocytoma, right [D35.01]    Discharge Diagnoses: same    Admission Condition:fair    Discharged Condition: good    Indication for Admission: admitted for right adrenalectomy due to [place] Course:  Patient had uneventful hospital course. She underwent robotic assisted right adrenalectomy. Patient was able to tolerate diet, ambulate, and have good pain control prior to discharge. Consults: hospitalist    Outstanding Order Results     No orders found from 4/10/2022 to 5/10/2022. Treatments: surgery: robotic right adrenalectomy    Discharge Exam:  Physical Exam  General: awake, alert, in no acute distress  HEENT: mucous membranes moist  Respiratory: normal effort, no wheezes appreciated  CV: appears well perfused    Abdomen: appropriately tender, incisions clean and dry with dermabond     Skin: warm and dry  Extremities: atraumatic  Neuro: no focal deficits noted  Psych: mood normal     Disposition: home    Patient Instructions:      Medication List      START taking these medications    docusate sodium 100 MG capsule  Commonly known as: COLACE  Take 1 capsule by mouth 2 times daily     ondansetron 4 MG tablet  Commonly known as: ZOFRAN  Take 1 tablet by mouth 3 times daily as needed for Nausea or Vomiting     oxyCODONE-acetaminophen 5-325 MG per tablet  Commonly known as: Percocet  Take 1 tablet by mouth every 6 hours as needed for Pain for up to 5 days. Intended supply: 5 days.  Take lowest dose possible to manage pain        CONTINUE taking these medications    glipiZIDE 10 MG tablet  Commonly known as: GLUCOTROL  Take 1 tablet by mouth 2 times daily (before meals)     glucose 4g chewable tablet  Take 4 tablets by mouth as needed for Low blood sugar     glucose monitoring kit  1 kit by Does not apply route daily     insulin  UNIT/ML injection vial  Commonly known as: HumuLIN N  Inject 20 Units into the skin 2 times daily (before meals)     ROYA INS SYR .3CC/29G 29G X 1/2\" 0.3 ML Misc  Generic drug: Insulin Syringe-Needle U-100  1 each by Does not apply route daily     lisinopril 20 MG tablet  Commonly known as: PRINIVIL;ZESTRIL  Take 1 tablet by mouth daily     metFORMIN 500 MG tablet  Commonly known as: GLUCOPHAGE  Take 1 tablet by mouth 2 times daily (with meals)     metoprolol succinate 25 MG extended release tablet  Commonly known as: TOPROL XL  Take 0.5 tablets by mouth in the morning and at bedtime     simvastatin 20 MG tablet  Commonly known as: ZOCOR  Take 1 tablet by mouth nightly        STOP taking these medications    phenoxybenzamine 10 MG capsule  Commonly known as: DIBENZYLINE           Where to Get Your Medications      You can get these medications from any pharmacy    Bring a paper prescription for each of these medications  · docusate sodium 100 MG capsule  · ondansetron 4 MG tablet  · oxyCODONE-acetaminophen 5-325 MG per tablet         Activity: no heavy lifting for 4 weeks  Diet:regular diet  Wound Care: keep wound clean and dry    Follow-up with Dr. Raysa Flor by calling [phone]. The Office staff will determine follow up time per protocol.     Signed:  Carlyon Denver, MD

## 2022-05-12 NOTE — ANESTHESIA POSTPROCEDURE EVALUATION
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 Kavon Phase I: Kavon Score: 9    Kavon Phase II:      Last vitals: Reviewed and per EMR flowsheets.        Anesthesia Post Evaluation    Patient location during evaluation: PACU  Patient participation: complete - patient participated  Level of consciousness: awake and alert  Pain score: 3  Airway patency: patent  Nausea & Vomiting: no nausea and no vomiting  Complications: no  Cardiovascular status: blood pressure returned to baseline  Respiratory status: acceptable  Hydration status: euvolemic

## 2022-05-25 ENCOUNTER — OFFICE VISIT (OUTPATIENT)
Dept: SURGERY | Age: 47
End: 2022-05-25

## 2022-05-25 VITALS
HEIGHT: 67 IN | WEIGHT: 174 LBS | OXYGEN SATURATION: 97 % | HEART RATE: 96 BPM | DIASTOLIC BLOOD PRESSURE: 90 MMHG | SYSTOLIC BLOOD PRESSURE: 142 MMHG | BODY MASS INDEX: 27.31 KG/M2

## 2022-05-25 DIAGNOSIS — D35.01 PHEOCHROMOCYTOMA OF RIGHT ADRENAL GLAND: ICD-10-CM

## 2022-05-25 DIAGNOSIS — Z09 POSTOPERATIVE EXAMINATION: Primary | ICD-10-CM

## 2022-05-25 PROCEDURE — 99024 POSTOP FOLLOW-UP VISIT: CPT | Performed by: SURGERY

## 2022-05-25 ASSESSMENT — PATIENT HEALTH QUESTIONNAIRE - PHQ9
SUM OF ALL RESPONSES TO PHQ QUESTIONS 1-9: 0
2. FEELING DOWN, DEPRESSED OR HOPELESS: 0
SUM OF ALL RESPONSES TO PHQ9 QUESTIONS 1 & 2: 0
SUM OF ALL RESPONSES TO PHQ QUESTIONS 1-9: 0
1. LITTLE INTEREST OR PLEASURE IN DOING THINGS: 0

## 2022-05-25 NOTE — PROGRESS NOTES
Post-Operative Clinic Note    Chief Complaint   Patient presents with    Post-Op Check     1ST PO RT Adrenalectomy @ [facility] 5/9/22          SUBJECTIVE:  Patient is here today for a post-operative visit. Patient is s/p robotic assisted right adrenalectomy on 5/9/22. She reports doing well. Denies nausea or vomiting. Having bowel function. Denies significant post op pain. Past Surgical History:   Procedure Laterality Date    ADRENALECTOMY Right 5/9/2022    RIGHT ADRENALECTOMY LAPAROSCOPIC ROBOTIC performed by Dee Dee Hess MD at [facility] N/A      Past Medical History:   Diagnosis Date    Hyperlipidemia     Hypertension     Tubal ectopic pregnancy     Type 2 diabetes mellitus without complication, without long-term current use of insulin ([facility] [address]     Uterine fibroid      No family history on file. Social History     Socioeconomic History    Marital status: Single     Spouse name: Not on file    Number of children: Not on file    Years of education: Not on file    Highest education level: Not on file   Occupational History    Not on file   Tobacco Use    Smoking status: Never Smoker    Smokeless tobacco: Never Used   Vaping Use    Vaping Use: Unknown   Substance and Sexual Activity    Alcohol use: Never    Drug use: Never    Sexual activity: Not on file   Other Topics Concern    Not on file   Social History Narrative    Not on file     Social Determinants of Health     Financial Resource Strain:     Difficulty of Paying Living Expenses: Not on file   Food Insecurity:     Worried About Running Out of Food in the Last Year: Not on file    Galilea of Food in the Last Year: Not on file   Transportation Needs:     Lack of Transportation (Medical): Not on file    Lack of Transportation (Non-Medical):  Not on file   Physical Activity:     Days of Exercise per Week: Not on file    Minutes of Exercise per Session: Not on file   Stress:     Feeling of Stress : Not on file   Social Connections:     Frequency of Communication with Friends and Family: Not on file    Frequency of Social Gatherings with Friends and Family: Not on file    Attends [employer] Services: Not on file    Active Member of Clubs or Organizations: Not on file    Attends Club or Organization Meetings: Not on file    Marital Status: Not on file   Intimate Partner Violence:     Fear of Current or Ex-Partner: Not on file    Emotionally Abused: Not on file    Physically Abused: Not on file    Sexually Abused: Not on file   Housing Stability:     Unable to Pay for Housing in the Last Year: Not on file    Number of [place] in the Last Year: Not on file    Unstable Housing in the Last Year: Not on file       OBJECTIVE:  Physical Exam  General: awake, alert, in no acute distress  HEENT: mucous membranes moist  Respiratory: normal effort, no wheezes appreciated  CV: appears well perfused  Abdomen: Soft, nontender, non-distended. Port sites well healed  Skin: warm and dry  Extremities: atraumatic  Neuro: no focal deficits noted  Psych: mood normal        Pathology report reveals:   pheochromocytoma    ASSESSMENT:  52 y.o. female s/p robotic right adrenalectomy. Doing well. Pathology report was reviewed. 1. Postoperative examination    2.  Pheochromocytoma of right adrenal gland        PLAN:  - has appointment with OB/gyn to discuss fibroids and infertility  - will ask Oncology to see now that pathology is available to discuss any further recommendations from their standpoint  - call if problems arise      Karlene Busby MD  5/25/2022  3:41 PM

## 2022-08-02 NOTE — PROGRESS NOTES
Arnulfo Copping 8/2/22 1423 . Gay  used ID # [id number] I left a voicemail with my call back number and a request to return my call to complete the PAT assessment. I did leave pre-op instructions. Covid screen done -     Surgery is at [facility] on  8/10/22 at 1200 with an arrival time of 1000. IF times change, you will be called on  8/9/22 between 1430 and 1600 to confirm new times. times. 1. Do not eat or drink anything after midnight - unless instructed by your doctor prior to surgery. This includes no water, chewing gum or mints. 2. Follow your directions as prescribed by the doctor for your procedure and medications. 3. Check with your Doctor regarding stopping vitamins, supplements, blood thinners (Plavix, Coumadin, Lovenox, Effient, Pradaxa, Xarelto, Fragmin or other blood thinners) and follow their instructions. Stop all supplements and herbals 7 days prior to surgery. Morning of surgery take metformin and metoprolol with a sip of water. 4. Do not smoke, and do not drink any alcoholic beverages 24 hours prior to surgery. This includes NA Beer. 5. You may brush your teeth and gargle the morning of surgery. DO NOT SWALLOW WATER    6. You MUST make arrangements for a responsible adult to take you home after your surgery and be able to check on you every couple hours for the day. You will not be allowed to leave alone or drive yourself home. It is strongly suggested someone stay with you the first 24  hrs. Your surgery will be cancelled if you do not have a ride home. 7. Please wear simple, loose fitting clothing to the hospital.  Gigi Flores not bring valuables (money, credit cards, checkbooks, etc.) Do not wear any makeup (including no eye makeup) or nail polish on your fingers or toes. 8. DO NOT wear any jewelry or piercings on day of surgery. All body piercing jewelry must be removed.                9. If you have dentures, they will be removed before going to the OR; we will provide you a container. If you wear contact lenses or glasses,  they will be removed; please bring a case for them. 10. If you  have a Living Will and Durable Power of  for Healthcare, please bring in a copy. 11. Please bring picture ID,  insurance card, paperwork from the doctors office    (H & P, Consent, & card for implantable devices). 12. Take a shower the morning of your procedure, do not apply any lotion, oil or powder. It is recommended to use Hibiclens or CHG wash during pre-op shower/bath.              13. Wear a mask covering your nose & mouth when entering the hospital.

## 2022-08-09 ENCOUNTER — ANESTHESIA EVENT (OUTPATIENT)
Dept: OPERATING ROOM | Age: 47
End: 2022-08-09

## 2022-08-09 NOTE — ANESTHESIA PRE PROCEDURE
Department of Anesthesiology  Preprocedure Note       Name:  Brody Mackey   Age:  52 y.o.  :  1975                                          MRN:  [id number]         Date:  2022      Surgeon: Jessica Camara):  Ari Ferrari MD    Procedure: Procedure(s):  CERVIX CONE BIOPSY WITH COLD KNIFE    Medications prior to admission:   Prior to Admission medications    Medication Sig Start Date End Date Taking? Authorizing Provider   metoprolol succinate (TOPROL XL) 25 MG extended release tablet Take 0.5 tablets by mouth in the morning and at bedtime 5/3/22   Amaya Leggett MD   glipiZIDE (GLUCOTROL) 10 MG tablet Take 1 tablet by mouth 2 times daily (before meals) 22   Leah Milner MD   glucose 4g chewable tablet Take 4 tablets by mouth as needed for Low blood sugar 22   Leah Milner MD   metFORMIN (GLUCOPHAGE) 500 MG tablet Take 1 tablet by mouth 2 times daily (with meals) 22  Leah Milner MD   lisinopril (PRINIVIL;ZESTRIL) 20 MG tablet Take 1 tablet by mouth daily 22  Leah Milner MD   simvastatin (ZOCOR) 20 MG tablet Take 1 tablet by mouth nightly 22  Leah Milner MD   glucose monitoring (FREESTYLE FREEDOM) kit 1 kit by Does not apply route daily 22   Leah Milner MD   Insulin Syringe-Needle U-100 ([employer] INS SYR .3CC/29G) 29G X 1/2\" 0.3 ML MISC 1 each by Does not apply route daily 22   Leah Milner MD   insulin NPH (HUMULIN N) 100 UNIT/ML injection vial Inject 20 Units into the skin 2 times daily (before meals) 4/26/22 6/15/22  Leah Milner MD       Current medications:    No current facility-administered medications for this encounter.      Current Outpatient Medications   Medication Sig Dispense Refill    metoprolol succinate (TOPROL XL) 25 MG extended release tablet Take 0.5 tablets by mouth in the morning and at bedtime 60 tablet 0    glipiZIDE (GLUCOTROL) 10 MG tablet Take 1 tablet by mouth 2 times daily (before meals) 60 tablet 0    glucose 4g chewable tablet Take 4 tablets by mouth as needed for Low blood sugar 60 tablet 0    metFORMIN (GLUCOPHAGE) 500 MG tablet Take 1 tablet by mouth 2 times daily (with meals) 60 tablet 0    lisinopril (PRINIVIL;ZESTRIL) 20 MG tablet Take 1 tablet by mouth daily 30 tablet 0    simvastatin (ZOCOR) 20 MG tablet Take 1 tablet by mouth nightly 30 tablet 0    glucose monitoring (FREESTYLE FREEDOM) kit 1 kit by Does not apply route daily 1 kit 0    Insulin Syringe-Needle U-100 ([employer] INS SYR .3CC/29G) 29G X 1/2\" 0.3 ML MISC 1 each by Does not apply route daily 100 each 3    insulin NPH (HUMULIN N) 100 UNIT/ML injection vial Inject 20 Units into the skin 2 times daily (before meals) 10 mL 1       Allergies:  No Known Allergies    Problem List:    Patient Active Problem List   Diagnosis Code    Adrenal mass greater than 4 cm in diameter (HCC) E27.8    Encounter for preadmission testing Z01.818    Pheochromocytoma, right D35.01       Past Medical History:        Diagnosis Date    Hyperlipidemia     Hypertension     Tubal ectopic pregnancy     Type 2 diabetes mellitus without complication, without long-term current use of insulin (HCC)     Uterine fibroid        Past Surgical History:        Procedure Laterality Date    ADRENALECTOMY Right 5/9/2022    RIGHT ADRENALECTOMY LAPAROSCOPIC ROBOTIC performed by Ofe Lane MD at [facility] N/A        Social History:    Social History     Tobacco Use    Smoking status: Never    Smokeless tobacco: Never   Substance Use Topics    Alcohol use: Never                                Counseling given: Not Answered      Vital Signs (Current): There were no vitals filed for this visit.                                            BP Readings from Last 3 Encounters:   05/25/22 (!) 142/90   05/11/22 132/81   05/09/22 102/60       NPO Status:                                                                                 BMI:   Wt Readings from Last 3 Encounters:   05/25/22 174 lb (78.9 kg)   05/11/22 174 lb (78.9 kg)   04/23/22 150 lb (68 kg)     There is no height or weight on file to calculate BMI.    CBC:   Lab Results   Component Value Date/Time    WBC 10.2 05/11/2022 02:18 PM    RBC 4.36 05/11/2022 02:18 PM    HGB 12.4 05/11/2022 02:18 PM    HCT 39.3 05/11/2022 02:18 PM    MCV 90.1 05/11/2022 02:18 PM    RDW 12.7 05/11/2022 02:18 PM     05/11/2022 02:18 PM       CMP:   Lab Results   Component Value Date/Time     05/11/2022 02:18 PM    K 4.5 05/11/2022 02:18 PM    CL 97 05/11/2022 02:18 PM    CO2 29 05/11/2022 02:18 PM    BUN 10 05/11/2022 02:18 PM    CREATININE 0.5 05/11/2022 02:18 PM    GFRAA >60 05/11/2022 02:18 PM    LABGLOM >60 05/11/2022 02:18 PM    GLUCOSE 195 05/11/2022 02:18 PM    PROT 6.1 05/11/2022 02:18 PM    CALCIUM 8.8 05/11/2022 02:18 PM    BILITOT 0.2 05/11/2022 02:18 PM    ALKPHOS 110 05/11/2022 02:18 PM    AST 60 05/11/2022 02:18 PM    ALT 72 05/11/2022 02:18 PM       POC Tests: No results for input(s): POCGLU, POCNA, POCK, POCCL, POCBUN, POCHEMO, POCHCT in the last 72 hours. Coags: No results found for: PROTIME, INR, APTT    HCG (If Applicable):   Lab Results   Component Value Date    PREGTESTUR NEGATIVE 05/09/2022        ABGs: No results found for: PHART, PO2ART, ZNJ5OQN, WKM2JNZ, BEART, K3FTFKQO     Type & Screen (If Applicable):  No results found for: LABABO, LABRH    Drug/Infectious Status (If Applicable):  No results found for: HIV, HEPCAB    COVID-19 Screening (If Applicable): No results found for: COVID19        Anesthesia Evaluation  Patient summary reviewed  Airway:           Dental:          Pulmonary:                              Cardiovascular:    (+) hypertension:,          Beta Blocker:  Dose within 24 Hrs         Neuro/Psych:               GI/Hepatic/Renal:             Endo/Other:    (+) DiabetesType II DM, poorly controlled, using insulin, .                   ROS comment: Right pheochromocytoma, s/p right adrenalectomy  Abdominal:             Vascular: Other Findings:           Anesthesia Plan      general     ASA 3     (Pregnancy test?   Needs  Krista Truong))  Induction: intravenous.                             THAO Albright - AMALIA   8/9/2022

## 2022-08-09 NOTE — PROGRESS NOTES
8/9/22- Notified patient surgery via voicemail @ [facility] on 8/10/22 @ 1200, arrival 1000

## 2022-08-09 NOTE — PROGRESS NOTES
8/9/2022 left message at Dr. Grisel Dorsey office re pt never responded to PAT messages.  SDS also aware

## 2022-08-10 ENCOUNTER — HOSPITAL ENCOUNTER (OUTPATIENT)
Age: 47
Setting detail: OUTPATIENT SURGERY
Discharge: HOME | End: 2022-08-10
Attending: OBSTETRICS & GYNECOLOGY | Admitting: OBSTETRICS & GYNECOLOGY

## 2022-08-10 ENCOUNTER — ANESTHESIA (OUTPATIENT)
Dept: OPERATING ROOM | Age: 47
End: 2022-08-10

## 2022-08-10 VITALS
RESPIRATION RATE: 18 BRPM | TEMPERATURE: 97.2 F | DIASTOLIC BLOOD PRESSURE: 91 MMHG | OXYGEN SATURATION: 94 % | HEART RATE: 70 BPM | BODY MASS INDEX: 27.25 KG/M2 | HEIGHT: 67 IN | SYSTOLIC BLOOD PRESSURE: 141 MMHG

## 2022-08-10 DIAGNOSIS — R87.810 CERVICAL HIGH RISK HUMAN PAPILLOMAVIRUS (HPV) DNA TEST POSITIVE: ICD-10-CM

## 2022-08-10 DIAGNOSIS — G89.18 ACUTE POSTOPERATIVE PAIN: ICD-10-CM

## 2022-08-10 DIAGNOSIS — Z01.818 ENCOUNTER FOR PREADMISSION TESTING: Primary | ICD-10-CM

## 2022-08-10 DIAGNOSIS — R87.613 PAP SMEAR ABNORMALITY OF CERVIX WITH HGSIL: ICD-10-CM

## 2022-08-10 LAB
GLUCOSE BLD-MCNC: 134 MG/DL (ref 70–99)
PREGNANCY TEST URINE, POC: NEGATIVE

## 2022-08-10 PROCEDURE — 81025 URINE PREGNANCY TEST: CPT

## 2022-08-10 PROCEDURE — 7100000000 HC PACU RECOVERY - FIRST 15 MIN: Performed by: OBSTETRICS & GYNECOLOGY

## 2022-08-10 PROCEDURE — 2580000003 HC RX 258: Performed by: OBSTETRICS & GYNECOLOGY

## 2022-08-10 PROCEDURE — 2580000003 HC RX 258: Performed by: ANESTHESIOLOGY

## 2022-08-10 PROCEDURE — 3700000001 HC ADD 15 MINUTES (ANESTHESIA): Performed by: OBSTETRICS & GYNECOLOGY

## 2022-08-10 PROCEDURE — 6370000000 HC RX 637 (ALT 250 FOR IP)

## 2022-08-10 PROCEDURE — 6360000002 HC RX W HCPCS: Performed by: NURSE ANESTHETIST, CERTIFIED REGISTERED

## 2022-08-10 PROCEDURE — 7100000011 HC PHASE II RECOVERY - ADDTL 15 MIN: Performed by: OBSTETRICS & GYNECOLOGY

## 2022-08-10 PROCEDURE — 2580000003 HC RX 258: Performed by: NURSE ANESTHETIST, CERTIFIED REGISTERED

## 2022-08-10 PROCEDURE — 82962 GLUCOSE BLOOD TEST: CPT

## 2022-08-10 PROCEDURE — 3700000000 HC ANESTHESIA ATTENDED CARE: Performed by: OBSTETRICS & GYNECOLOGY

## 2022-08-10 PROCEDURE — 2720000010 HC SURG SUPPLY STERILE: Performed by: OBSTETRICS & GYNECOLOGY

## 2022-08-10 PROCEDURE — 2500000003 HC RX 250 WO HCPCS: Performed by: OBSTETRICS & GYNECOLOGY

## 2022-08-10 PROCEDURE — 88305 TISSUE EXAM BY PATHOLOGIST: CPT | Performed by: PATHOLOGY

## 2022-08-10 PROCEDURE — 3600000003 HC SURGERY LEVEL 3 BASE: Performed by: OBSTETRICS & GYNECOLOGY

## 2022-08-10 PROCEDURE — 2709999900 HC NON-CHARGEABLE SUPPLY: Performed by: OBSTETRICS & GYNECOLOGY

## 2022-08-10 PROCEDURE — 7100000001 HC PACU RECOVERY - ADDTL 15 MIN: Performed by: OBSTETRICS & GYNECOLOGY

## 2022-08-10 PROCEDURE — 3600000013 HC SURGERY LEVEL 3 ADDTL 15MIN: Performed by: OBSTETRICS & GYNECOLOGY

## 2022-08-10 PROCEDURE — 7100000010 HC PHASE II RECOVERY - FIRST 15 MIN: Performed by: OBSTETRICS & GYNECOLOGY

## 2022-08-10 PROCEDURE — 88307 TISSUE EXAM BY PATHOLOGIST: CPT | Performed by: PATHOLOGY

## 2022-08-10 PROCEDURE — 6360000002 HC RX W HCPCS: Performed by: OBSTETRICS & GYNECOLOGY

## 2022-08-10 RX ORDER — MIDAZOLAM HYDROCHLORIDE 1 MG/ML
INJECTION INTRAMUSCULAR; INTRAVENOUS PRN
Status: DISCONTINUED | OUTPATIENT
Start: 2022-08-10 | End: 2022-08-10 | Stop reason: SDUPTHER

## 2022-08-10 RX ORDER — LABETALOL HYDROCHLORIDE 5 MG/ML
10 INJECTION, SOLUTION INTRAVENOUS
Status: DISCONTINUED | OUTPATIENT
Start: 2022-08-10 | End: 2022-08-10 | Stop reason: HOSPADM

## 2022-08-10 RX ORDER — IPRATROPIUM BROMIDE AND ALBUTEROL SULFATE 2.5; .5 MG/3ML; MG/3ML
1 SOLUTION RESPIRATORY (INHALATION)
Status: DISCONTINUED | OUTPATIENT
Start: 2022-08-10 | End: 2022-08-10 | Stop reason: HOSPADM

## 2022-08-10 RX ORDER — CEFAZOLIN SODIUM 2 G/50ML
SOLUTION INTRAVENOUS PRN
Status: DISCONTINUED | OUTPATIENT
Start: 2022-08-10 | End: 2022-08-10 | Stop reason: SDUPTHER

## 2022-08-10 RX ORDER — HYDRALAZINE HYDROCHLORIDE 20 MG/ML
10 INJECTION INTRAMUSCULAR; INTRAVENOUS
Status: DISCONTINUED | OUTPATIENT
Start: 2022-08-10 | End: 2022-08-10 | Stop reason: HOSPADM

## 2022-08-10 RX ORDER — PROPOFOL 10 MG/ML
INJECTION, EMULSION INTRAVENOUS PRN
Status: DISCONTINUED | OUTPATIENT
Start: 2022-08-10 | End: 2022-08-10 | Stop reason: SDUPTHER

## 2022-08-10 RX ORDER — HALOPERIDOL 5 MG/ML
1 INJECTION INTRAMUSCULAR
Status: DISCONTINUED | OUTPATIENT
Start: 2022-08-10 | End: 2022-08-10 | Stop reason: HOSPADM

## 2022-08-10 RX ORDER — HYDROCODONE BITARTRATE AND ACETAMINOPHEN 5; 325 MG/1; MG/1
1 TABLET ORAL EVERY 6 HOURS PRN
Qty: 10 TABLET | Refills: 0 | Status: SHIPPED | OUTPATIENT
Start: 2022-08-10 | End: 2022-08-13

## 2022-08-10 RX ORDER — FENTANYL CITRATE 50 UG/ML
50 INJECTION, SOLUTION INTRAMUSCULAR; INTRAVENOUS EVERY 5 MIN PRN
Status: DISCONTINUED | OUTPATIENT
Start: 2022-08-10 | End: 2022-08-10 | Stop reason: HOSPADM

## 2022-08-10 RX ORDER — LIDOCAINE HYDROCHLORIDE 20 MG/ML
INJECTION, SOLUTION INTRAVENOUS PRN
Status: DISCONTINUED | OUTPATIENT
Start: 2022-08-10 | End: 2022-08-10 | Stop reason: SDUPTHER

## 2022-08-10 RX ORDER — MIDAZOLAM HYDROCHLORIDE 2 MG/2ML
2 INJECTION, SOLUTION INTRAMUSCULAR; INTRAVENOUS
Status: DISCONTINUED | OUTPATIENT
Start: 2022-08-10 | End: 2022-08-10 | Stop reason: HOSPADM

## 2022-08-10 RX ORDER — DIPHENHYDRAMINE HYDROCHLORIDE 50 MG/ML
12.5 INJECTION INTRAMUSCULAR; INTRAVENOUS
Status: DISCONTINUED | OUTPATIENT
Start: 2022-08-10 | End: 2022-08-10 | Stop reason: HOSPADM

## 2022-08-10 RX ORDER — PROCHLORPERAZINE EDISYLATE 5 MG/ML
5 INJECTION INTRAMUSCULAR; INTRAVENOUS
Status: DISCONTINUED | OUTPATIENT
Start: 2022-08-10 | End: 2022-08-10 | Stop reason: HOSPADM

## 2022-08-10 RX ORDER — DEXAMETHASONE SODIUM PHOSPHATE 4 MG/ML
INJECTION, SOLUTION INTRA-ARTICULAR; INTRALESIONAL; INTRAMUSCULAR; INTRAVENOUS; SOFT TISSUE PRN
Status: DISCONTINUED | OUTPATIENT
Start: 2022-08-10 | End: 2022-08-10 | Stop reason: SDUPTHER

## 2022-08-10 RX ORDER — KETOROLAC TROMETHAMINE 30 MG/ML
INJECTION, SOLUTION INTRAMUSCULAR; INTRAVENOUS PRN
Status: DISCONTINUED | OUTPATIENT
Start: 2022-08-10 | End: 2022-08-10 | Stop reason: SDUPTHER

## 2022-08-10 RX ORDER — OXYCODONE HYDROCHLORIDE 5 MG/1
5 TABLET ORAL
Status: DISCONTINUED | OUTPATIENT
Start: 2022-08-10 | End: 2022-08-10 | Stop reason: HOSPADM

## 2022-08-10 RX ORDER — LIDOCAINE HYDROCHLORIDE AND EPINEPHRINE 10; 10 MG/ML; UG/ML
INJECTION, SOLUTION INFILTRATION; PERINEURAL
Status: COMPLETED | OUTPATIENT
Start: 2022-08-10 | End: 2022-08-10

## 2022-08-10 RX ORDER — FENTANYL CITRATE 50 UG/ML
INJECTION, SOLUTION INTRAMUSCULAR; INTRAVENOUS PRN
Status: DISCONTINUED | OUTPATIENT
Start: 2022-08-10 | End: 2022-08-10 | Stop reason: SDUPTHER

## 2022-08-10 RX ORDER — ACETIC ACID 5 %
500 LIQUID (ML) MISCELLANEOUS ONCE
Status: DISCONTINUED | OUTPATIENT
Start: 2022-08-10 | End: 2022-08-10 | Stop reason: HOSPADM

## 2022-08-10 RX ORDER — MEPERIDINE HYDROCHLORIDE 25 MG/ML
6.25 INJECTION INTRAMUSCULAR; INTRAVENOUS; SUBCUTANEOUS EVERY 5 MIN PRN
Status: DISCONTINUED | OUTPATIENT
Start: 2022-08-10 | End: 2022-08-10 | Stop reason: HOSPADM

## 2022-08-10 RX ORDER — SODIUM CHLORIDE, SODIUM LACTATE, POTASSIUM CHLORIDE, CALCIUM CHLORIDE 600; 310; 30; 20 MG/100ML; MG/100ML; MG/100ML; MG/100ML
INJECTION, SOLUTION INTRAVENOUS ONCE
Status: COMPLETED | OUTPATIENT
Start: 2022-08-10 | End: 2022-08-10

## 2022-08-10 RX ORDER — ONDANSETRON 2 MG/ML
INJECTION INTRAMUSCULAR; INTRAVENOUS PRN
Status: DISCONTINUED | OUTPATIENT
Start: 2022-08-10 | End: 2022-08-10 | Stop reason: SDUPTHER

## 2022-08-10 RX ORDER — SODIUM CHLORIDE, SODIUM LACTATE, POTASSIUM CHLORIDE, CALCIUM CHLORIDE 600; 310; 30; 20 MG/100ML; MG/100ML; MG/100ML; MG/100ML
INJECTION, SOLUTION INTRAVENOUS CONTINUOUS PRN
Status: DISCONTINUED | OUTPATIENT
Start: 2022-08-10 | End: 2022-08-10 | Stop reason: SDUPTHER

## 2022-08-10 RX ADMIN — CEFAZOLIN 2000 MG: 2 INJECTION, POWDER, FOR SOLUTION INTRAMUSCULAR; INTRAVENOUS at 12:44

## 2022-08-10 RX ADMIN — SODIUM CHLORIDE, POTASSIUM CHLORIDE, SODIUM LACTATE AND CALCIUM CHLORIDE: 600; 310; 30; 20 INJECTION, SOLUTION INTRAVENOUS at 13:00

## 2022-08-10 RX ADMIN — CEFAZOLIN SODIUM 2000 MG: 2 SOLUTION INTRAVENOUS at 13:05

## 2022-08-10 RX ADMIN — FENTANYL CITRATE 50 MCG: 50 INJECTION, SOLUTION INTRAMUSCULAR; INTRAVENOUS at 13:05

## 2022-08-10 RX ADMIN — LIDOCAINE HYDROCHLORIDE 50 MG: 20 INJECTION, SOLUTION INTRAVENOUS at 13:08

## 2022-08-10 RX ADMIN — DEXAMETHASONE SODIUM PHOSPHATE 8 MG: 4 INJECTION, SOLUTION INTRAMUSCULAR; INTRAVENOUS at 13:10

## 2022-08-10 RX ADMIN — PROPOFOL 150 MG: 10 INJECTION, EMULSION INTRAVENOUS at 13:08

## 2022-08-10 RX ADMIN — FENTANYL CITRATE 50 MCG: 50 INJECTION, SOLUTION INTRAMUSCULAR; INTRAVENOUS at 13:10

## 2022-08-10 RX ADMIN — SODIUM CHLORIDE, POTASSIUM CHLORIDE, SODIUM LACTATE AND CALCIUM CHLORIDE: 600; 310; 30; 20 INJECTION, SOLUTION INTRAVENOUS at 12:44

## 2022-08-10 RX ADMIN — KETOROLAC TROMETHAMINE 30 MG: 30 INJECTION, SOLUTION INTRAMUSCULAR; INTRAVENOUS at 13:38

## 2022-08-10 RX ADMIN — MIDAZOLAM 2 MG: 1 INJECTION INTRAMUSCULAR; INTRAVENOUS at 12:57

## 2022-08-10 RX ADMIN — ONDANSETRON 4 MG: 2 INJECTION INTRAMUSCULAR; INTRAVENOUS at 13:10

## 2022-08-10 ASSESSMENT — PAIN SCALES - GENERAL
PAINLEVEL_OUTOF10: 0

## 2022-08-10 ASSESSMENT — PAIN - FUNCTIONAL ASSESSMENT: PAIN_FUNCTIONAL_ASSESSMENT: 0-10

## 2022-08-10 NOTE — ANESTHESIA PRE PROCEDURE
Department of Anesthesiology  Preprocedure Note       Name:  Mary Fall   Age:  52 y.o.  :  1975                                          MRN:  [id number]         Date:  8/10/2022      Surgeon: Dimitris Forman):  Edward Gardner MD    Procedure: Procedure(s):  CERVIX CONE BIOPSY WITH COLD KNIFE    Medications prior to admission:   Prior to Admission medications    Medication Sig Start Date End Date Taking?  Authorizing Provider   metoprolol succinate (TOPROL XL) 25 MG extended release tablet Take 0.5 tablets by mouth in the morning and at bedtime 5/3/22   Jaison Blount MD   glipiZIDE (GLUCOTROL) 10 MG tablet Take 1 tablet by mouth 2 times daily (before meals) 22   Federico Formna MD   glucose 4g chewable tablet Take 4 tablets by mouth as needed for Low blood sugar 22   Federico Forman MD   metFORMIN (GLUCOPHAGE) 500 MG tablet Take 1 tablet by mouth 2 times daily (with meals) 22  Federico Forman MD   lisinopril (PRINIVIL;ZESTRIL) 20 MG tablet Take 1 tablet by mouth daily 22  Federico Forman MD   simvastatin (ZOCOR) 20 MG tablet Take 1 tablet by mouth nightly 22  Federico Forman MD   glucose monitoring (FREESTYLE FREEDOM) kit 1 kit by Does not apply route daily 22   Federico Forman MD   Insulin Syringe-Needle U-100 ([employer] INS SYR .3CC/29G) 29G X 1/2\" 0.3 ML MISC 1 each by Does not apply route daily 22   Federico Forman MD   insulin NPH (HUMULIN N) 100 UNIT/ML injection vial Inject 20 Units into the skin 2 times daily (before meals) 4/26/22 6/15/22  Federico Forman MD       Current medications:    Current Outpatient Medications   Medication Sig Dispense Refill    metoprolol succinate (TOPROL XL) 25 MG extended release tablet Take 0.5 tablets by mouth in the morning and at bedtime 60 tablet 0    glipiZIDE (GLUCOTROL) 10 MG tablet Take 1 tablet by mouth 2 times daily (before meals) 60 tablet 0    glucose 4g chewable tablet Take 4 tablets by mouth as needed for Low blood sugar 60 tablet 0    metFORMIN (GLUCOPHAGE) 500 MG tablet Take 1 tablet by mouth 2 times daily (with meals) 60 tablet 0    lisinopril (PRINIVIL;ZESTRIL) 20 MG tablet Take 1 tablet by mouth daily 30 tablet 0    simvastatin (ZOCOR) 20 MG tablet Take 1 tablet by mouth nightly 30 tablet 0    glucose monitoring (FREESTYLE FREEDOM) kit 1 kit by Does not apply route daily 1 kit 0    Insulin Syringe-Needle U-100 ([employer] INS SYR .3CC/29G) 29G X 1/2\" 0.3 ML MISC 1 each by Does not apply route daily 100 each 3    insulin NPH (HUMULIN N) 100 UNIT/ML injection vial Inject 20 Units into the skin 2 times daily (before meals) 10 mL 1     No current facility-administered medications for this visit. Allergies:  No Known Allergies    Problem List:    Patient Active Problem List   Diagnosis Code    Adrenal mass greater than 4 cm in diameter ([facility] [address] E27.8    Encounter for preadmission testing Z01.818    Pheochromocytoma, right D35.01       Past Medical History:        Diagnosis Date    Hyperlipidemia     Hypertension     Tubal ectopic pregnancy     Type 2 diabetes mellitus without complication, without long-term current use of insulin ([facility] [address]     Uterine fibroid        Past Surgical History:        Procedure Laterality Date    ADRENALECTOMY Right 5/9/2022    RIGHT ADRENALECTOMY LAPAROSCOPIC ROBOTIC performed by Vonnie Smith MD at [facility] N/A        Social History:    Social History     Tobacco Use    Smoking status: Never    Smokeless tobacco: Never   Substance Use Topics    Alcohol use: Never                                Counseling given: Not Answered      Vital Signs (Current): There were no vitals filed for this visit.                                            BP Readings from Last 3 Encounters:   05/25/22 (!) 142/90   05/11/22 132/81   05/09/22 102/60       NPO Status:                                                                                 BMI:   Wt Readings from Last 3 Encounters:   05/25/22 174 lb (78.9 kg)   05/11/22 174 lb (78.9 kg)   04/23/22 150 lb (68 kg)     There is no height or weight on file to calculate BMI.    CBC:   Lab Results   Component Value Date/Time    WBC 10.2 05/11/2022 02:18 PM    RBC 4.36 05/11/2022 02:18 PM    HGB 12.4 05/11/2022 02:18 PM    HCT 39.3 05/11/2022 02:18 PM    MCV 90.1 05/11/2022 02:18 PM    RDW 12.7 05/11/2022 02:18 PM     05/11/2022 02:18 PM       CMP:   Lab Results   Component Value Date/Time     05/11/2022 02:18 PM    K 4.5 05/11/2022 02:18 PM    CL 97 05/11/2022 02:18 PM    CO2 29 05/11/2022 02:18 PM    BUN 10 05/11/2022 02:18 PM    CREATININE 0.5 05/11/2022 02:18 PM    GFRAA >60 05/11/2022 02:18 PM    LABGLOM >60 05/11/2022 02:18 PM    GLUCOSE 195 05/11/2022 02:18 PM    PROT 6.1 05/11/2022 02:18 PM    CALCIUM 8.8 05/11/2022 02:18 PM    BILITOT 0.2 05/11/2022 02:18 PM    ALKPHOS 110 05/11/2022 02:18 PM    AST 60 05/11/2022 02:18 PM    ALT 72 05/11/2022 02:18 PM       POC Tests: No results for input(s): POCGLU, POCNA, POCK, POCCL, POCBUN, POCHEMO, POCHCT in the last 72 hours.     Coags: No results found for: PROTIME, INR, APTT    HCG (If Applicable):   Lab Results   Component Value Date    PREGTESTUR NEGATIVE 05/09/2022        ABGs: No results found for: PHART, PO2ART, QXY7SOJ, WFP0DJS, BEART, U7AOYVOF     Type & Screen (If Applicable):  No results found for: LABABO, LABRH    Drug/Infectious Status (If Applicable):  No results found for: HIV, HEPCAB    COVID-19 Screening (If Applicable): No results found for: COVID19        Anesthesia Evaluation  Patient summary reviewed no history of anesthetic complications:   Airway: Mallampati: II  TM distance: >3 FB   Neck ROM: full  Mouth opening: > = 3 FB   Dental: normal exam         Pulmonary:Negative Pulmonary ROS                              Cardiovascular:  Exercise tolerance: good (>4 METS),   (+) hypertension:, hyperlipidemia         Beta Blocker:  Dose within 24 Hrs         Neuro/Psych:   Negative Neuro/Psych ROS              GI/Hepatic/Renal: Neg GI/Hepatic/Renal ROS            Endo/Other:    (+) DiabetesType II DM, poorly controlled, using insulin, . ROS comment: Right pheochromocytoma, s/p right adrenalectomy  Abdominal:             Vascular: negative vascular ROS. Other Findings:             Anesthesia Plan      general     ASA 2     (Exam aided by use of  service)  Induction: intravenous. MIPS: Postoperative opioids intended and Prophylactic antiemetics administered. Anesthetic plan and risks discussed with patient. Plan discussed with CRNA. Pre Anesthesia Evaluation complete. Anesthesia plan, risks, benefits, alternatives, and personal involved discussed with patient. Patients and/or legal guardian verbalized an understanding  and agreed to proceed.   Perlita Bains DO  8/10/2022

## 2022-08-10 NOTE — OP NOTE
[facility]  [id number] OPERATIVE NOTE      DATE OF PROCEDURE:  8/10/2022    SURGEON:  Jori Mcnulty MD      ASSISTANT:    PREOPERATIVE DIAGNOSIS:  severe cervical dysplasia. POSTOPERATIVE DIAGNOSIS:  severe cervical dysplasia. OPERATION:  Cold knife cone biopsy with ECC and EM curettings    ANESTHESIA:  general    ESTIMATED BLOOD LOSS: Less than 50 mlCOMPLICATIONS:  None. PROCEDURE NOTE:  With the patient in the lithotomy position, a  speculum was inserted and the cervix was circumferentially injected with lidocaine with dilute epinephrine. Next, using a 11 blade and palacios scissors, cervical conization was performed and marked at 12. ECC carried out and both specimens are submitted to pathology. Endometrium sounded to 9 cm and dilation with Hegar dilators and brisk EM curettage was also performed. The bed of the cone was coagulated with a  5-mm ball and o vicryl suture and fibrillar were placed for further  hemostasis. Inspection revealed excellent hemostasis. The procedure  was terminated. All instruments were removed. The patient was placed  in supine position and transferred out of the operating room in good,  stable condition. Dictated by [facility]. RAMESH GALARZA             [phone]

## 2022-08-10 NOTE — ANESTHESIA POSTPROCEDURE EVALUATION
Department of Anesthesiology  Postprocedure Note    Patient: Matilda Newell  MRN: [id number]  YOB: 1975  Date of evaluation: 8/10/2022      Procedure Summary     Date: 08/10/22 Room / Location: [address] [place] [address] OR [address] [place] [address]    Anesthesia Start: 1300 Anesthesia Stop: 5481    Procedure: CERVIX CONE BIOPSY WITH COLD KNIFE (Cervix) Diagnosis:       Pap smear abnormality of cervix with HGSIL      Cervical high risk human papillomavirus (HPV) DNA test positive      (Pap smear abnormality of cervix with HGSIL [R87.613])      (Cervical high risk human papillomavirus (HPV) DNA test positive [R87.810])    Surgeons: Jeane Aschoff, MD Responsible Provider: Kamran Steele DO    Anesthesia Type: general ASA Status: 2          Anesthesia Type: No value filed.     Kavon Phase I: Kavon Score: 3    Kavon Phase II:        Anesthesia Post Evaluation    Patient location during evaluation: PACU  Patient participation: complete - patient participated  Level of consciousness: sleepy but conscious  Airway patency: patent  Nausea & Vomiting: no nausea  Complications: no  Cardiovascular status: hemodynamically stable  Respiratory status: acceptable  Hydration status: euvolemic

## 2022-08-10 NOTE — PROGRESS NOTES
1058- this nurse called phone number on file to see if patient will be coming in for procedure. No answer.

## 2022-08-10 NOTE — PROGRESS NOTES
[phone] Patient arrived to PACU, monitors placed and alarms on. Received report from [facility]. Patient asleep with oral airway in place. 1400 Patient arousing, oral airway removed. Patient taking deep breaths and maintaining 02 sats. Patient is [place] speaking but able to answer yes/no questions. Patient denied any complaints of pain or nausea. Falls back to sleep but arouses easily. 1423 Patient more wakeful. Tolerating ice chips. Denies any nausea or complaints. 1433 Transported to [facility] [place] 21. Report given to [facility] RN at bedside.

## 2022-08-10 NOTE — H&P
Department of Gynecology  History & Physical            CHIEF COMPLAINT:   No chief complaint on file. HISTORY OF PRESENT ILLNESS:     The patient is a 52y.o. year old [place] female,  with one ectopic pregnancy and one living child with complaints of No chief complaint on file. .    Past Medical History:        Diagnosis Date    Hyperlipidemia     Hypertension     Tubal ectopic pregnancy     Type 2 diabetes mellitus without complication, without long-term current use of insulin ([address]     Uterine fibroid      Past Surgical History:        Procedure Laterality Date    ADRENALECTOMY Right 2022    RIGHT ADRENALECTOMY LAPAROSCOPIC ROBOTIC performed by Tim Mcnamara MD at [address] [place] [address] N/A        Past Gynecological History:    1.. Menses: monthly heavy  2. Contraception: None  3. Sexually transmitted disease history: HPV        A. Number of sexual partners in the last 6 months: 1    meds:No current facility-administered medications for this encounter.     Current Outpatient Medications:     metoprolol succinate (TOPROL XL) 25 MG extended release tablet, Take 0.5 tablets by mouth in the morning and at bedtime, Disp: 60 tablet, Rfl: 0    glipiZIDE (GLUCOTROL) 10 MG tablet, Take 1 tablet by mouth 2 times daily (before meals), Disp: 60 tablet, Rfl: 0    glucose 4g chewable tablet, Take 4 tablets by mouth as needed for Low blood sugar, Disp: 60 tablet, Rfl: 0    metFORMIN (GLUCOPHAGE) 500 MG tablet, Take 1 tablet by mouth 2 times daily (with meals), Disp: 60 tablet, Rfl: 0    lisinopril (PRINIVIL;ZESTRIL) 20 MG tablet, Take 1 tablet by mouth daily, Disp: 30 tablet, Rfl: 0    simvastatin (ZOCOR) 20 MG tablet, Take 1 tablet by mouth nightly, Disp: 30 tablet, Rfl: 0    glucose monitoring (FREESTYLE FREEDOM) kit, 1 kit by Does not apply route daily, Disp: 1 kit, Rfl: 0    Insulin Syringe-Needle U-100 ([employer] INS SYR .3CC/29G) 29G X 1/2\" 0.3 ML MISC, 1 each by Does not apply route daily, Disp: 100 each, Rfl: 3    insulin NPH (HUMULIN N) 100 UNIT/ML injection vial, Inject 20 Units into the skin 2 times daily (before meals), Disp: 10 mL, Rfl: 1       Allergies:  Patient has no known allergies. Social History:  [address], [place] speaker    Family History:   No family history on file. PHYSICAL EXAM:    Vitals: There were no vitals taken for this visit.     CONSTITUTIONAL:  awake, alert, unarousable, uncooperative, no apparent distress, appears stated age, and thin  BACK:  Symmetric, no curvature, spinous processes are non-tender on palpation, paraspinous muscles are non-tender on palpation, no costal vertebral tenderness  LUNGS:  No increased work of breathing, good air exchange, clear to auscultation bilaterally, no crackles or wheezing  CARDIOVASCULAR:  regular rate and rhythm and normal S1 and S2  ABDOMEN:  No scars, normal bowel sounds, soft, non-distended, non-tender, no masses palpated, no hepatosplenomegally  External Genitalia: Abnormal findings: enlarged uterus  Vagina: General appearance normal, Estrogen effect normal, Discharge absent, Lesions absent, Pelvic support normal  Cervix: General appearance normal, Lesions absent, Discharge absent, Tenderness absent, Enlargement absent, Nodularity absent  Uterus:  Size normal, Contour normal    DATA:  Labs:  CBC:   Lab Results   Component Value Date/Time    WBC 10.2 05/11/2022 02:18 PM    RBC 4.36 05/11/2022 02:18 PM    HGB 12.4 05/11/2022 02:18 PM    HCT 39.3 05/11/2022 02:18 PM    MCV 90.1 05/11/2022 02:18 PM    RDW 12.7 05/11/2022 02:18 PM     05/11/2022 02:18 PM       IMPRESSION/RECOMMENDATIONS:      Here for Cold knife cone and ECC/ EM curettage    Zenon Baumgarten, MD  8/10/2022  11:39 AM

## 2022-08-10 NOTE — PROGRESS NOTES
1240 requested financial assistance form from registration  21  left message for [facility] case management

## 2022-10-01 ENCOUNTER — HOSPITAL ENCOUNTER (EMERGENCY)
Age: 47
Discharge: HOME | End: 2022-10-01

## 2022-10-01 ENCOUNTER — APPOINTMENT (OUTPATIENT)
Dept: CT IMAGING | Age: 47
End: 2022-10-01

## 2022-10-01 VITALS
SYSTOLIC BLOOD PRESSURE: 150 MMHG | DIASTOLIC BLOOD PRESSURE: 94 MMHG | RESPIRATION RATE: 16 BRPM | HEART RATE: 85 BPM | TEMPERATURE: 97.9 F | OXYGEN SATURATION: 96 %

## 2022-10-01 DIAGNOSIS — R03.0 ELEVATED BLOOD PRESSURE READING WITHOUT DIAGNOSIS OF HYPERTENSION: ICD-10-CM

## 2022-10-01 DIAGNOSIS — R51.9 NONINTRACTABLE HEADACHE, UNSPECIFIED CHRONICITY PATTERN, UNSPECIFIED HEADACHE TYPE: Primary | ICD-10-CM

## 2022-10-01 LAB
ALBUMIN SERPL-MCNC: 4.5 GM/DL (ref 3.4–5)
ALP BLD-CCNC: 137 IU/L (ref 40–129)
ALT SERPL-CCNC: 27 U/L (ref 10–40)
ANION GAP SERPL CALCULATED.3IONS-SCNC: 9 MMOL/L (ref 4–16)
AST SERPL-CCNC: 23 IU/L (ref 15–37)
BACTERIA: NORMAL /HPF
BASOPHILS ABSOLUTE: 0 K/CU MM
BASOPHILS RELATIVE PERCENT: 0.2 % (ref 0–1)
BILIRUB SERPL-MCNC: 0.2 MG/DL (ref 0–1)
BILIRUBIN URINE: NEGATIVE
BLOOD, URINE: NORMAL
BUN BLDV-MCNC: 11 MG/DL (ref 6–23)
CALCIUM SERPL-MCNC: 9.9 MG/DL (ref 8.3–10.6)
CHLORIDE BLD-SCNC: 101 MMOL/L (ref 99–110)
CLARITY: CLEAR
CO2: 30 MMOL/L (ref 21–32)
COLOR: YELLOW
CREAT SERPL-MCNC: 0.5 MG/DL (ref 0.6–1.1)
DIFFERENTIAL TYPE: ABNORMAL
EOSINOPHILS ABSOLUTE: 0.1 K/CU MM
EOSINOPHILS RELATIVE PERCENT: 0.7 % (ref 0–3)
GFR AFRICAN AMERICAN: >60 ML/MIN/1.73M2
GFR NON-AFRICAN AMERICAN: >60 ML/MIN/1.73M2
GLUCOSE BLD-MCNC: 96 MG/DL (ref 70–99)
GLUCOSE, URINE: NEGATIVE MG/DL
HCG QUALITATIVE: NEGATIVE
HCT VFR BLD CALC: 47.3 % (ref 37–47)
HEMOGLOBIN: 15.1 GM/DL (ref 12.5–16)
IMMATURE NEUTROPHIL %: 0.2 % (ref 0–0.43)
KETONES, URINE: NEGATIVE MG/DL
LEUKOCYTE ESTERASE, URINE: NEGATIVE
LYMPHOCYTES ABSOLUTE: 3.6 K/CU MM
LYMPHOCYTES RELATIVE PERCENT: 41.3 % (ref 24–44)
MCH RBC QN AUTO: 28 PG (ref 27–31)
MCHC RBC AUTO-ENTMCNC: 31.9 % (ref 32–36)
MCV RBC AUTO: 87.8 FL (ref 78–100)
MONOCYTES ABSOLUTE: 0.4 K/CU MM
MONOCYTES RELATIVE PERCENT: 4.9 % (ref 0–4)
MUCUS: NORMAL HPF
NITRITE URINE, QUANTITATIVE: NEGATIVE
NUCLEATED RBC %: 0 %
PDW BLD-RTO: 12.4 % (ref 11.7–14.9)
PH, URINE: 6.5
PLATELET # BLD: 282 K/CU MM (ref 140–440)
PMV BLD AUTO: 11.4 FL (ref 7.5–11.1)
POTASSIUM SERPL-SCNC: 4.1 MMOL/L (ref 3.5–5.1)
PROTEIN UA: NEGATIVE MG/DL
RBC # BLD: 5.39 M/CU MM (ref 4.2–5.4)
RBC URINE: 3 /HPF
SEGMENTED NEUTROPHILS ABSOLUTE COUNT: 4.6 K/CU MM
SEGMENTED NEUTROPHILS RELATIVE PERCENT: 52.7 % (ref 36–66)
SODIUM BLD-SCNC: 140 MMOL/L (ref 135–145)
SPECIFIC GRAVITY UA: 1.01
SQUAMOUS EPITHELIAL: 17 /HPF
TOTAL IMMATURE NEUTOROPHIL: 0.02 K/CU MM
TOTAL NUCLEATED RBC: 0 K/CU MM
TOTAL PROTEIN: 8.1 GM/DL (ref 6.4–8.2)
TRICHOMONAS: NORMAL /HPF
TROPONIN T: <0.01 NG/ML
UROBILINOGEN, URINE: 0.2 MG/DL
WBC # BLD: 8.8 K/CU MM (ref 4–10.5)
WBC UA: 1 /HPF

## 2022-10-01 PROCEDURE — 80053 COMPREHEN METABOLIC PANEL: CPT

## 2022-10-01 PROCEDURE — 81001 URINALYSIS AUTO W/SCOPE: CPT

## 2022-10-01 PROCEDURE — 2580000003 HC RX 258: Performed by: PHYSICIAN ASSISTANT

## 2022-10-01 PROCEDURE — 84703 CHORIONIC GONADOTROPIN ASSAY: CPT

## 2022-10-01 PROCEDURE — 84484 ASSAY OF TROPONIN QUANT: CPT

## 2022-10-01 PROCEDURE — 96374 THER/PROPH/DIAG INJ IV PUSH: CPT

## 2022-10-01 PROCEDURE — 70450 CT HEAD/BRAIN W/O DYE: CPT

## 2022-10-01 PROCEDURE — 6360000002 HC RX W HCPCS: Performed by: PHYSICIAN ASSISTANT

## 2022-10-01 PROCEDURE — 99284 EMERGENCY DEPT VISIT MOD MDM: CPT

## 2022-10-01 PROCEDURE — 85025 COMPLETE CBC W/AUTO DIFF WBC: CPT

## 2022-10-01 PROCEDURE — 96375 TX/PRO/DX INJ NEW DRUG ADDON: CPT

## 2022-10-01 PROCEDURE — 96361 HYDRATE IV INFUSION ADD-ON: CPT

## 2022-10-01 RX ORDER — METOCLOPRAMIDE HYDROCHLORIDE 5 MG/ML
10 INJECTION INTRAMUSCULAR; INTRAVENOUS ONCE
Status: COMPLETED | OUTPATIENT
Start: 2022-10-01 | End: 2022-10-01

## 2022-10-01 RX ORDER — 0.9 % SODIUM CHLORIDE 0.9 %
1000 INTRAVENOUS SOLUTION INTRAVENOUS ONCE
Status: COMPLETED | OUTPATIENT
Start: 2022-10-01 | End: 2022-10-01

## 2022-10-01 RX ORDER — DIPHENHYDRAMINE HYDROCHLORIDE 50 MG/ML
50 INJECTION INTRAMUSCULAR; INTRAVENOUS ONCE
Status: COMPLETED | OUTPATIENT
Start: 2022-10-01 | End: 2022-10-01

## 2022-10-01 RX ORDER — BUTALBITAL, ACETAMINOPHEN AND CAFFEINE 50; 325; 40 MG/1; MG/1; MG/1
1 TABLET ORAL EVERY 4 HOURS PRN
Qty: 20 TABLET | Refills: 0 | Status: SHIPPED | OUTPATIENT
Start: 2022-10-01

## 2022-10-01 RX ADMIN — METOCLOPRAMIDE 10 MG: 5 INJECTION, SOLUTION INTRAMUSCULAR; INTRAVENOUS at 17:19

## 2022-10-01 RX ADMIN — SODIUM CHLORIDE 1000 ML: 9 INJECTION, SOLUTION INTRAVENOUS at 17:19

## 2022-10-01 RX ADMIN — DIPHENHYDRAMINE HYDROCHLORIDE 50 MG: 50 INJECTION, SOLUTION INTRAMUSCULAR; INTRAVENOUS at 17:19

## 2022-10-01 ASSESSMENT — PAIN DESCRIPTION - LOCATION: LOCATION: HEAD

## 2022-10-01 ASSESSMENT — PAIN DESCRIPTION - DESCRIPTORS: DESCRIPTORS: ACHING

## 2022-10-01 ASSESSMENT — PAIN SCALES - GENERAL: PAINLEVEL_OUTOF10: 10

## 2022-10-01 NOTE — ED PROVIDER NOTES
eMERGENCY dEPARTMENT eNCOUnter         [address] [facility]    PCP: Amarilys Triana MD    CHIEF COMPLAINT    Chief Complaint   Patient presents with    Headache     Since this AM       HPI    Saintase Bertrum Leather is a 52 y.o. female who presents with headache and elevated blood pressure. Patient is [place] speaking only so  services were utilized throughout patient encounter. Onset was prior to arrival.  Context is patient states that she awoke this morning with generalized frontal forehead headache without preceding trauma or injury. Pain is 10/10 with gradual onset of a headache but has not tried any over-the-counter medications for leaf of symptoms. States that the headache is \"affecting my vision\" but is denying any blurred vision or double vision. Is endorsing photophobia. Has had nausea without vomiting or diarrhea. Patient is also concerned for her blood pressure as it has been elevated. Has no diagnosed history of hypertension is not on any oral antihypertensives. Denying any chest pain shortness of breath nausea vomiting palpitations dizziness lightheadedness or near syncope. No numbness or tingling in the upper or lower extremities. Patient states that that she did not have any underlying history for migraine headaches. She states the headache started \"while I was drinking coffee. \"  Does not describe any maximal intensity or thunderclap onset of headache. No other neck pain or stiffness, fever chills, cough, URI, sinus congestion. REVIEW OF SYSTEMS    Constitutional:  Denies fever, chills, weight loss or weakness   Neurologic:   Denies confusion or memory loss. Denies light-headedness, dizziness, or LOC. Denies stiff neck. Denies weakness or sensory changes   Eyes:   Denies discharge .    Has photophobia,  Denies dipplopia, blurred vision, or loss visual field  HENT:  Denies sore throat or ear pain   Cardiovascular: Denies chest pain, palpitations. Respiratory:  Denies cough, shortness of breath, respiratory discomfort   G/:  Denies abdominal pain, diarrhea. + nausea. No vomiting. Denies Dysuria or Hematuria.   Adamantly denies pregnancy  Musculoskeletal:  Denies back pain   Skin:  Denies rash   Endocrine:  Denies polyuria or polydypsia   Lymphatic:  Denies swollen glands   Psychiatric:  Denies depression, suicidal ideation or homicidal ideation     All other review of systems are negative  See HPI and nursing notes for additional information       PAST MEDICAL & SURGICAL HISTORY    Past Medical History:   Diagnosis Date    Hyperlipidemia     Hypertension     Tubal ectopic pregnancy     Type 2 diabetes mellitus without complication, without long-term current use of insulin ([facility] [address]     Uterine fibroid      Past Surgical History:   Procedure Laterality Date    ADRENALECTOMY Right 5/9/2022    RIGHT ADRENALECTOMY LAPAROSCOPIC ROBOTIC performed by Mike Baptiste MD at [facility] 8/10/2022    CERVIX CONE BIOPSY WITH COLD KNIFE performed by Felicitas Mendoza MD at [place] [address]    Current Outpatient Rx   Medication Sig Dispense Refill    butalbital-acetaminophen-caffeine (FIORICET, ESGIC) -40 MG per tablet Take 1 tablet by mouth every 4 hours as needed for Headaches 20 tablet 0    metoprolol succinate (TOPROL XL) 25 MG extended release tablet Take 0.5 tablets by mouth in the morning and at bedtime 60 tablet 0    glipiZIDE (GLUCOTROL) 10 MG tablet Take 1 tablet by mouth 2 times daily (before meals) 60 tablet 0    glucose 4g chewable tablet Take 4 tablets by mouth as needed for Low blood sugar 60 tablet 0    metFORMIN (GLUCOPHAGE) 500 MG tablet Take 1 tablet by mouth 2 times daily (with meals) 60 tablet 0    lisinopril (PRINIVIL;ZESTRIL) 20 MG tablet Take 1 tablet by mouth daily 30 tablet 0    simvastatin (ZOCOR) 20 MG tablet Take 1 tablet by mouth nightly 30 tablet 0    glucose monitoring (FREESTYLE FREEDOM) kit 1 kit by Does not apply route daily 1 kit 0    Insulin Syringe-Needle U-100 ([employer] INS SYR .3CC/29G) 29G X 1/2\" 0.3 ML MISC 1 each by Does not apply route daily 100 each 3    insulin NPH (HUMULIN N) 100 UNIT/ML injection vial Inject 20 Units into the skin 2 times daily (before meals) 10 mL 1       ALLERGIES    No Known Allergies    SOCIAL & FAMILY HISTORY    Social History     Socioeconomic History    Marital status: Single     Spouse name: None    Number of children: None    Years of education: None    Highest education level: None   Tobacco Use    Smoking status: Never    Smokeless tobacco: Never   Vaping Use    Vaping Use: Unknown   Substance and Sexual Activity    Alcohol use: Never    Drug use: Never     History reviewed. No pertinent family history. PHYSICAL EXAM    VITAL SIGNS: BP (!) 150/94   Pulse 85   Temp 97.9 °F (36.6 °C)   Resp 16   SpO2 96%    Constitutional:  Well developed, well nourished, no acute distress, sitting upright in chair, smiling, nontoxic  Neurologic:    - Alert & oriented x3, no slurred speech  - No obvious gross motor deficits  - Cranial nerves 2-12 grossly intact  - Normal finger to nose test bilaterally  - Rapid alternating movements intact  - Upper and lower extremity DTRs intact. - 5/5 strength of upper and lower extremities with  and dorsi/plantar flexion on resistance  - Sensation equal and intact to light/sharp touch  - Brudzinski and Kernig's signs negative  -  Patient ambulates in the ED with a steady gait, No pronator drift. HENT:  Atraumatic, Normocephalic. No temporal artery tenderness. Moist mucus membranes, airway patent  Eyes: Conjunctiva clear. No tearing . Pupils equally round and react to light, extraocular movement are intact.   Funduscopic exam reveals red reflex intact without obvious retinal hemorrhages or defects, no obvious papilledema - fundoscopic exam is limited as not a dilated exam  Neck: supple, no JVD. Cardiovascular:  Reg rate & rhythm, no murmurs/rubs/gallops.   Respiratory:  Lungs Clear, no retractions   GI:  Soft, nontender, normal bowel sounds  Musculoskeletal:  No edema, no deformities  Integument:  Well hydrated, no petechiae   Psych: Pleasant affect, no hallucinations    I have reviewed and interpreted all of the currently available lab results from this visit (if applicable):  Results for orders placed or performed during the hospital encounter of 10/01/22   CBC with Auto Differential   Result Value Ref Range    WBC 8.8 4.0 - 10.5 K/CU MM    RBC 5.39 4.2 - 5.4 M/CU MM    Hemoglobin 15.1 12.5 - 16.0 GM/DL    Hematocrit 47.3 (H) 37 - 47 %    MCV 87.8 78 - 100 FL    MCH 28.0 27 - 31 PG    MCHC 31.9 (L) 32.0 - 36.0 %    RDW 12.4 11.7 - 14.9 %    Platelets 892 348 - 471 K/CU MM    MPV 11.4 (H) 7.5 - 11.1 FL    Differential Type AUTOMATED DIFFERENTIAL     Segs Relative 52.7 36 - 66 %    Lymphocytes % 41.3 24 - 44 %    Monocytes % 4.9 (H) 0 - 4 %    Eosinophils % 0.7 0 - 3 %    Basophils % 0.2 0 - 1 %    Segs Absolute 4.6 K/CU MM    Lymphocytes Absolute 3.6 K/CU MM    Monocytes Absolute 0.4 K/CU MM    Eosinophils Absolute 0.1 K/CU MM    Basophils Absolute 0.0 K/CU MM    Nucleated RBC % 0.0 %    Total Nucleated RBC 0.0 K/CU MM    Total Immature Neutrophil 0.02 K/CU MM    Immature Neutrophil % 0.2 0 - 0.43 %   Comprehensive Metabolic Panel   Result Value Ref Range    Sodium 140 135 - 145 MMOL/L    Potassium 4.1 3.5 - 5.1 MMOL/L    Chloride 101 99 - 110 mMol/L    CO2 30 21 - 32 MMOL/L    BUN 11 6 - 23 MG/DL    Creatinine 0.5 (L) 0.6 - 1.1 MG/DL    Glucose 96 70 - 99 MG/DL    Calcium 9.9 8.3 - 10.6 MG/DL    Albumin 4.5 3.4 - 5.0 GM/DL    Total Protein 8.1 6.4 - 8.2 GM/DL    Total Bilirubin 0.2 0.0 - 1.0 MG/DL    ALT 27 10 - 40 U/L    AST 23 15 - 37 IU/L    Alkaline Phosphatase 137 (H) 40 - 129 IU/L    GFR Non-African American >60 >60 mL/min/1.73m2    GFR African American >60 >60 mL/min/1.73m2    Anion Gap 9 4 - 16   Troponin   Result Value Ref Range    Troponin T <0.010 <0.01 NG/ML   Urinalysis   Result Value Ref Range    Color, UA YELLOW     Clarity, UA CLEAR     Glucose, Urine NEGATIVE MG/DL    Bilirubin Urine NEGATIVE     Ketones, Urine NEGATIVE MG/DL    Specific Gravity, UA 1.010     Blood, Urine TRACE     pH, Urine 6.5     Protein, UA NEGATIVE MG/DL    Urobilinogen, Urine 0.2 MG/DL    Nitrite Urine, Quantitative NEGATIVE     Leukocyte Esterase, Urine NEGATIVE    HCG Serum, Qualitative   Result Value Ref Range    hCG Qual NEGATIVE    Microscopic Urinalysis   Result Value Ref Range    RBC, UA 3 /HPF    WBC, UA 1 /HPF    Bacteria, UA RARE /HPF    Squam Epithel, UA 17 /HPF    Mucus, UA RARE HPF    Trichomonas, UA NONE SEEN /HPF        IMAGING:   Radiographs (if obtained):  [] Radiologist's Report Reviewed:     CT HEAD WO CONTRAST (Final result)  Result time 10/01/22 18:53:19  Final result by Олег Huertas MD (10/01/22 18:53:19)                Impression:    No acute intracranial abnormality. Narrative:    EXAMINATION:   CT OF THE HEAD WITHOUT CONTRAST  10/1/2022 5:18 pm     TECHNIQUE:   CT of the head was performed without the administration of intravenous   contrast. Automated exposure control, iterative reconstruction, and/or weight   based adjustment of the mA/kV was utilized to reduce the radiation dose to as   low as reasonably achievable. COMPARISON:   None. HISTORY:   ORDERING SYSTEM PROVIDED HISTORY: headache, elevatad blood pressure   TECHNOLOGIST PROVIDED HISTORY:   Has a \"code stroke\" or \"stroke alert\" been called? ->No   Reason for exam:->headache, elevatad blood pressure   Decision Support Exception - unselect if not a suspected or confirmed   emergency medical condition->Emergency Medical Condition (MA)   Is the patient pregnant?->No   Reason for Exam: headache, elevatad blood pressure     FINDINGS:   BRAIN/VENTRICLES: There is no acute intracranial hemorrhage, mass effect or   midline shift. No abnormal extra-axial fluid collection. The gray-white   differentiation is maintained without evidence of an acute infarct. There is   no evidence of hydrocephalus. ORBITS: The visualized portion of the orbits demonstrate no acute abnormality. SINUSES: The visualized paranasal sinuses and mastoid air cells demonstrate   no acute abnormality. SOFT TISSUES/SKULL:  No acute abnormality of the visualized skull or soft   tissues. Differential Diagnosis: Subarachnoid hemorrhage, Meningitis, Temporal arteritis, Pseudotumor Cerebri, Acute renal failure, Migraine,      ED COURSE & MEDICAL DECISION MAKING      Vital signs and nursing notes reviewed during ED course. I have independently evaluated this patient. Supervising physician, Dr. Edward Calix - was present in ED and available for consultation throughout entirety of patient's care. All pertinent Lab data and radiographic results reviewed with patient at bedside. The patient and / or the family were informed of the results of any tests, a time was given to answer questions, a plan was proposed and they agreed with plan. Clinical  IMPRESSION    1. Nonintractable headache, unspecified chronicity pattern, unspecified headache type    2. Elevated blood pressure reading without diagnosis of hypertension          Patient presents for headache and elevated blood pressures. On exam, well-appearing nontoxic 49-year-old female, noted hypertensive 193/111 otherwise not tachycardic hypoxic tachypneic. Asymptomatic for any chest pain or shortness of breath complaints. Nonfocal neurological exam.  She is ambulating ED with steady gait. Equal strength range of motion sensation to bilateral upper and lower extremities. No nuchal rigidity. Unremarkable cardiopulmonary exam.  Abdomen soft nontender. Patient given IV fluids, Benadryl and Reglan. Labs are reassuring.   Normal white count hemoglobin and platelets. CMP without significant derangement, normal renal function, no evidence of endorgan damage. Urine pregnancy negative. Normal troponin. UA shows 3 red blood cells otherwise negative for bacteria, noted 1 white blood cell however sample was contained with 17 squamous epithelial cells. CT head shows no evidence of acute cranial process. Following medication the ED, patient's blood pressure does improve to 150/94. On reassessment, has had complete resolution of headache. At this time, I do suspect nonacute non-intractable headache. Patient does have elevated blood pressure readings here without diagnosed history but no evidence of endorgan damage or other hypertensive emergency/crisis findings that requires rapid blood pressure lowering at this time. Did discuss that given her elevated blood pressures and headache despite negative head CT, cannot completely rule out other acute intracranial process including subarachnoid hemorrhage. Discussed next steps would be possible LP versus CTA imaging to rule out CVA/subarachnoid hemorrhage however patient is declining further work-up while in the ED as her symptoms have resolved. I did discuss outpatient follow-up with family doctor for serial blood pressure rechecks, encouraged to keep a blood pressure diary however should headache return or begin having neurodeficits, she would need to return back to the ED for possible repeat imaging. . I estimate there is low risk for bacterial meningitis,  intracranial hemorrhage, ischemic or hemorrhage CVA or acute coronary syndrome thus I consider the discharge disposition reasonable. Patient (or their surrogate) and I have discussed the diagnosis and risks, and we agree with discharging home with close follow-up. Patient is discharged in stable condition with Fioricet.   Educated on lifestyle/diet modifications to minimize cardiovascular risk factors as well as manage blood pressure at home including Dash diet we have discussed the symptoms which are most concerning that necessitate immediate return, including fever, neck stiffness, vision changes, or new neurological symptoms. Patient is noted to have elevated blood pressure in the ED without known/diagnosed history of hypertension. He/she is not currently on antihypertensives. Currently denying any chest pain, shortness of breath, headache, dizziness or other signs or symptoms concerning for hypertensive emergency/crisis requiring rapid blood pressure lowering at this time. Had a lengthy discussion with patient regarding blood pressure management and that poorly controlled hypertension can lead to worsening medical conditions and even death including ACS/MI, dissection, stroke, end organ injury, etc.  Strongly encouraged to follow up with PCP and/or cardiologist for blood pressure rechecks and antihypertensive medications. Diagnosis and plan discussed in detail with patient who understands and agrees. Return to emergency Department precautions, which included any change in nature or severity of headaches or any new symptoms, were discussed in detail with patient who understands and agrees.       (Please note the MDM and HPI sections of this note were completed with a voice recognition program.  Efforts were made to edit the dictations but occasionally words are mis-transcribed.)         Joey Saha PA-C  10/01/22 1919

## 2023-06-22 ENCOUNTER — HOSPITAL ENCOUNTER (OUTPATIENT)
Age: 48
Setting detail: SPECIMEN
Discharge: HOME | End: 2023-06-22

## 2023-06-23 LAB
ALBUMIN SERPL-MCNC: 4.5 G/DL (ref 3.5–5.2)
ALBUMIN/GLOB SERPL: 1.4 {RATIO} (ref 1–2.5)
ALP SERPL-CCNC: 108 U/L (ref 35–104)
ALT SERPL-CCNC: 17 U/L (ref 5–33)
ANION GAP SERPL CALCULATED.3IONS-SCNC: 14 MMOL/L (ref 9–17)
AST SERPL-CCNC: 16 U/L
BILIRUB SERPL-MCNC: 0.2 MG/DL (ref 0.3–1.2)
BILIRUB UR QL STRIP: NEGATIVE
BUN SERPL-MCNC: 15 MG/DL (ref 6–20)
CALCIUM SERPL-MCNC: 9.3 MG/DL (ref 8.6–10.4)
CHLORIDE SERPL-SCNC: 101 MMOL/L (ref 98–107)
CHOLEST SERPL-MCNC: 203 MG/DL
CHOLESTEROL/HDL RATIO: 4.5
CLARITY UR: CLEAR
CO2 SERPL-SCNC: 25 MMOL/L (ref 20–31)
COLOR UR: YELLOW
COMMENT UA: NORMAL
CREAT SERPL-MCNC: 0.49 MG/DL (ref 0.5–0.9)
ERYTHROCYTE [DISTWIDTH] IN BLOOD BY AUTOMATED COUNT: 13.1 % (ref 11.8–14.4)
GFR SERPL CREATININE-BSD FRML MDRD: >60 ML/MIN/1.73M2
GLUCOSE SERPL-MCNC: 83 MG/DL (ref 70–99)
GLUCOSE UR STRIP-MCNC: NEGATIVE MG/DL
HCT VFR BLD AUTO: 46.3 % (ref 36.3–47.1)
HCV AB SERPL QL IA: NONREACTIVE
HDLC SERPL-MCNC: 45 MG/DL
HGB BLD-MCNC: 14.6 G/DL (ref 11.9–15.1)
HGB UR QL STRIP.AUTO: NEGATIVE
KETONES UR STRIP-MCNC: NEGATIVE MG/DL
LDLC SERPL CALC-MCNC: 142 MG/DL (ref 0–130)
LEUKOCYTE ESTERASE UR QL STRIP: NEGATIVE
MCH RBC QN AUTO: 28.7 PG (ref 25.2–33.5)
MCHC RBC AUTO-ENTMCNC: 31.5 G/DL (ref 28.4–34.8)
MCV RBC AUTO: 91.1 FL (ref 82.6–102.9)
NITRITE UR QL STRIP: NEGATIVE
NRBC AUTOMATED: 0 PER 100 WBC
PH UR STRIP: 7 [PH] (ref 5–8)
PLATELET # BLD AUTO: 334 K/UL (ref 138–453)
PMV BLD AUTO: 11.1 FL (ref 8.1–13.5)
POTASSIUM SERPL-SCNC: 4.2 MMOL/L (ref 3.7–5.3)
PROT SERPL-MCNC: 7.7 G/DL (ref 6.4–8.3)
PROT UR STRIP-MCNC: NEGATIVE MG/DL
RBC # BLD AUTO: 5.08 M/UL (ref 3.95–5.11)
SODIUM SERPL-SCNC: 140 MMOL/L (ref 135–144)
SP GR UR STRIP: 1.02 (ref 1–1.03)
TRIGL SERPL-MCNC: 82 MG/DL
TSH SERPL DL<=0.05 MIU/L-ACNC: 1.82 UIU/ML (ref 0.3–5)
UROBILINOGEN UR STRIP-ACNC: NORMAL
WBC OTHER # BLD: 9.5 K/UL (ref 3.5–11.3)

## 2023-06-26 LAB
C TRACH DNA SPEC QL PROBE+SIG AMP: NEGATIVE
N GONORRHOEA DNA SPEC QL PROBE+SIG AMP: NEGATIVE
SPECIMEN DESCRIPTION: NORMAL

## 2023-06-27 LAB
HPV I/H RISK 4 DNA CVX QL NAA+PROBE: NOT DETECTED
HPV SAMPLE: ABNORMAL
HPV, INTERPRETATION: ABNORMAL
HPV16 DNA CVX QL NAA+PROBE: NOT DETECTED
HPV18 DNA CVX QL NAA+PROBE: DETECTED
SPECIMEN DESCRIPTION: ABNORMAL

## 2023-06-29 LAB — CYTOLOGY REPORT: NORMAL

## 2023-11-15 NOTE — FLOWSHEET NOTE
Patient educated on use of delivered glucometer, insulin vial dosing and administration. Also educated about hypoglycemia, hyperglycemia, new medications, diabetic diet and sick days. Also information relayed via the . [id number]

## (undated) DEVICE — TRAY PREP DRY W/ PREM GLV 2 APPL 6 SPNG 2 UNDPD 1 OVERWRAP

## (undated) DEVICE — INTENDED FOR TISSUE SEPARATION, AND OTHER PROCEDURES THAT REQUIRE A SHARP SURGICAL BLADE TO PUNCTURE OR CUT.: Brand: BARD-PARKER ® STAINLESS STEEL BLADES

## (undated) DEVICE — PAD MATERNITY CURITY ADH STRIP DISP

## (undated) DEVICE — SEAL

## (undated) DEVICE — PAD,NON-ADHERENT,3X8,STERILE,LF,1/PK: Brand: MEDLINE

## (undated) DEVICE — TOWEL,OR,DSP,ST,BLUE,STD,6/PK,12PK/CS: Brand: MEDLINE

## (undated) DEVICE — PACK,BASIC,SIRUS,V: Brand: MEDLINE

## (undated) DEVICE — NEEDLE HYPO 20GA L1.5IN YEL POLYPR HUB S STL REG BVL STR

## (undated) DEVICE — COUNTER NDL 30 COUNT FOAM STRP SGL MAG

## (undated) DEVICE — ADHESIVE SKIN CLSR 0.7ML TOP DERMBND ADV

## (undated) DEVICE — Device

## (undated) DEVICE — CATHETER,FOLEY,100%SILICONE,16FR,10ML,LF: Brand: MEDLINE

## (undated) DEVICE — TIP COVER ACCESSORY

## (undated) DEVICE — BAG SPEC REM 224ML W4XL6IN DIA10MM 1 HND GYN DISP ENDOPCH

## (undated) DEVICE — ARM DRAPE

## (undated) DEVICE — EXCEL 10FT (3.05 M) INSUFFLATION TUBING SET WITH 0.1 MICRON FILTER: Brand: EXCEL

## (undated) DEVICE — TROCAR: Brand: KII FIOS FIRST ENTRY

## (undated) DEVICE — CANNULA SEAL

## (undated) DEVICE — PREMIUM WET SKIN PREP TRAY: Brand: MEDLINE INDUSTRIES, INC.

## (undated) DEVICE — TOTAL TRAY, 16FR 10ML SIL FOLEY, URN: Brand: MEDLINE

## (undated) DEVICE — 3M™ IOBAN™ 2 ANTIMICROBIAL INCISE DRAPE 6650EZ: Brand: IOBAN™ 2

## (undated) DEVICE — HARMONIC ACE: Brand: ENDOWRIST;DAVINCI SI

## (undated) DEVICE — SYRINGE MED 30ML STD CLR PLAS LUERLOCK TIP N CTRL DISP

## (undated) DEVICE — TUBING, SUCTION, 9/32" X 10', STRAIGHT: Brand: MEDLINE

## (undated) DEVICE — GOWN,ECLIPSE,POLYRNF,BRTHSLV,XL,30/CS: Brand: MEDLINE

## (undated) DEVICE — YANKAUER,FLEXIBLE HANDLE,REGLR CAPACITY: Brand: MEDLINE INDUSTRIES, INC.

## (undated) DEVICE — DRAPE SHEET ULTRAGARD: Brand: MEDLINE

## (undated) DEVICE — ELECTRODE ES AD CRDLSS PT RET REM POLYHESIVE

## (undated) DEVICE — SUTURE VCRL SZ 4-0 L18IN ABSRB UD L19MM PS-2 3/8 CIR PRIM J496H

## (undated) DEVICE — COLUMN DRAPE

## (undated) DEVICE — SYRINGE MED 10ML LUERLOCK TIP W/O SFTY DISP

## (undated) DEVICE — SOLUTION IV 1000ML 0.9% SOD CHL FOR IRRIG PLAS CONT

## (undated) DEVICE — DISPOSABLE GRASPER: Brand: EPIX LAPAROSCOPIC GRASPER

## (undated) DEVICE — AGENT HEMSTAT W2XL4IN OXIDIZED REGENERATED CELOS ABSRB

## (undated) DEVICE — DRESSING TRNSPAR W5XL4.5IN FLM SHT SEMIPERMEABLE WIND

## (undated) DEVICE — GLOVE ORANGE PI 7 1/2   MSG9075

## (undated) DEVICE — GLOVE SURG SZ 6 THK91MIL LTX FREE SYN POLYISOPRENE ANTI

## (undated) DEVICE — SUTURE PERMA HND 2-0 L18IN NONABSORBABLE BLK X-1 L22IN 1/2 737G

## (undated) DEVICE — LINER,SEMI-RIGID,3000CC,50EA/CS: Brand: MEDLINE

## (undated) DEVICE — SUTURE VCRL SZ 3-0 L27IN ABSRB UD L26MM SH 1/2 CIR J416H

## (undated) DEVICE — SUTURE VCRL SZ 0 L27IN ABSRB UD L26MM CT-2 1/2 CIR J270H

## (undated) DEVICE — SUTURE SZ 0 27IN 5/8 CIR UR-6  TAPER PT VIOLET ABSRB VICRYL J603H

## (undated) DEVICE — GAUZE,SPONGE,4"X4",16PLY,XRAY,STRL,LF: Brand: MEDLINE

## (undated) DEVICE — ELECTRODE BALL DIA5MM TUNGSTEN LLETZ DURABLE RESIST

## (undated) DEVICE — GOWN,SIRUS,POLYRNF,BRTHSLV,XLN/XL,20/CS: Brand: MEDLINE

## (undated) DEVICE — LEGGINGS, PAIR, CLEAR, STERILE: Brand: MEDLINE

## (undated) DEVICE — SET TBNG DISP TIP FOR AHTO

## (undated) DEVICE — SMOKE EVACUATION TUBING WITH 7/8 IN TO 1/4 IN REDUCER: Brand: BUFFALO FILTER

## (undated) DEVICE — PENCIL ES CRD L10FT HND SWCHING ROCK SWCH W/ EDGE COAT BLDE

## (undated) DEVICE — AGENT HEMSTAT W4XL4IN OXIDIZED REGENERATED CELOS ABSRB SFT

## (undated) DEVICE — PACK SURG LAP CHOLE

## (undated) DEVICE — GOWN,ECLIPSE,POLYRNF,BRTHSLV,L,30/CS: Brand: MEDLINE

## (undated) DEVICE — MARKER SURG SKIN UTIL REGULAR/FINE 2 TIP W/ RUL AND 9 LBL